# Patient Record
Sex: MALE | Race: WHITE
[De-identification: names, ages, dates, MRNs, and addresses within clinical notes are randomized per-mention and may not be internally consistent; named-entity substitution may affect disease eponyms.]

---

## 2017-03-09 ENCOUNTER — HOSPITAL ENCOUNTER (EMERGENCY)
Dept: HOSPITAL 62 - ER | Age: 67
Discharge: HOME | End: 2017-03-09
Payer: SELF-PAY

## 2017-03-09 VITALS — SYSTOLIC BLOOD PRESSURE: 128 MMHG | DIASTOLIC BLOOD PRESSURE: 98 MMHG

## 2017-03-09 DIAGNOSIS — R09.89: ICD-10-CM

## 2017-03-09 DIAGNOSIS — R06.02: ICD-10-CM

## 2017-03-09 DIAGNOSIS — R05: ICD-10-CM

## 2017-03-09 DIAGNOSIS — R68.83: ICD-10-CM

## 2017-03-09 DIAGNOSIS — Z99.81: ICD-10-CM

## 2017-03-09 DIAGNOSIS — R07.89: ICD-10-CM

## 2017-03-09 DIAGNOSIS — Z87.891: ICD-10-CM

## 2017-03-09 DIAGNOSIS — Z79.899: ICD-10-CM

## 2017-03-09 DIAGNOSIS — J44.9: Primary | ICD-10-CM

## 2017-03-09 LAB
ALBUMIN SERPL-MCNC: 4.2 G/DL (ref 3.5–5)
ALP SERPL-CCNC: 91 U/L (ref 38–126)
ALT SERPL-CCNC: 33 U/L (ref 21–72)
ANION GAP SERPL CALC-SCNC: 14 MMOL/L (ref 5–19)
AST SERPL-CCNC: 43 U/L (ref 17–59)
BASOPHILS NFR BLD MANUAL: 0 % (ref 0–2)
BILIRUB DIRECT SERPL-MCNC: 0 MG/DL (ref 0–0.3)
BILIRUB SERPL-MCNC: 0.6 MG/DL (ref 0.2–1.3)
BUN SERPL-MCNC: 10 MG/DL (ref 7–20)
CALCIUM: 9.8 MG/DL (ref 8.4–10.2)
CHLORIDE SERPL-SCNC: 88 MMOL/L (ref 98–107)
CK MB SERPL-MCNC: 0.69 NG/ML (ref ?–4.55)
CK SERPL-CCNC: 26 U/L (ref 55–170)
CO2 SERPL-SCNC: 37 MMOL/L (ref 22–30)
CREAT SERPL-MCNC: 0.55 MG/DL (ref 0.52–1.25)
EOSINOPHIL NFR BLD MANUAL: 0 % (ref 0–6)
ERYTHROCYTE [DISTWIDTH] IN BLOOD BY AUTOMATED COUNT: 13.4 % (ref 11.5–14)
GLUCOSE SERPL-MCNC: 124 MG/DL (ref 75–110)
HCT VFR BLD CALC: 50.9 % (ref 37.9–51)
HGB BLD-MCNC: 17.3 G/DL (ref 13.5–17)
HGB HCT DIFFERENCE: 1
MACROCYTES BLD QL SMEAR: (no result)
MCH RBC QN AUTO: 34.1 PG (ref 27–33.4)
MCHC RBC AUTO-ENTMCNC: 34 G/DL (ref 32–36)
MCV RBC AUTO: 100 FL (ref 80–97)
NEUTS BAND NFR BLD MANUAL: 6 % (ref 3–5)
POLYCHROMASIA BLD QL SMEAR: (no result)
POTASSIUM SERPL-SCNC: 4.2 MMOL/L (ref 3.6–5)
PROT SERPL-MCNC: 7.9 G/DL (ref 6.3–8.2)
RBC # BLD AUTO: 5.07 10^6/UL (ref 4.35–5.55)
SODIUM SERPL-SCNC: 139.4 MMOL/L (ref 137–145)
TOTAL CELLS COUNTED BLD: 100
TROPONIN I SERPL-MCNC: < 0.012 NG/ML
VARIANT LYMPHS NFR BLD MANUAL: 14 % (ref 13–45)
WBC # BLD AUTO: 10.2 10^3/UL (ref 4–10.5)

## 2017-03-09 PROCEDURE — 93005 ELECTROCARDIOGRAM TRACING: CPT

## 2017-03-09 PROCEDURE — 36415 COLL VENOUS BLD VENIPUNCTURE: CPT

## 2017-03-09 PROCEDURE — 99285 EMERGENCY DEPT VISIT HI MDM: CPT

## 2017-03-09 PROCEDURE — 71010: CPT

## 2017-03-09 PROCEDURE — 84484 ASSAY OF TROPONIN QUANT: CPT

## 2017-03-09 PROCEDURE — 94640 AIRWAY INHALATION TREATMENT: CPT

## 2017-03-09 PROCEDURE — 85025 COMPLETE CBC W/AUTO DIFF WBC: CPT

## 2017-03-09 PROCEDURE — 93010 ELECTROCARDIOGRAM REPORT: CPT

## 2017-03-09 PROCEDURE — 96374 THER/PROPH/DIAG INJ IV PUSH: CPT

## 2017-03-09 PROCEDURE — 82550 ASSAY OF CK (CPK): CPT

## 2017-03-09 PROCEDURE — 82553 CREATINE MB FRACTION: CPT

## 2017-03-09 PROCEDURE — 80053 COMPREHEN METABOLIC PANEL: CPT

## 2017-03-09 PROCEDURE — 83880 ASSAY OF NATRIURETIC PEPTIDE: CPT

## 2017-03-09 RX ADMIN — ALBUTEROL SULFATE SCH MG: 2.5 SOLUTION RESPIRATORY (INHALATION) at 15:22

## 2017-03-09 RX ADMIN — ALBUTEROL SULFATE SCH MG: 2.5 SOLUTION RESPIRATORY (INHALATION) at 14:56

## 2017-03-09 NOTE — ER DOCUMENT REPORT
ED Medical Screen (RME)





- General


Stated Complaint: CHEST PAIN


Notes: 


patient is a 66 year old male p/w chest pain and productive cough for one week 

that has gooten worse about two days ago. SOB, dyspnea. Chest pain is worse 

with cough. Albuterol nebulizers helping with cough. 





PMH: COPD on 3L, CAD


former smoker








Stress test 3-4 years ago but denies cath














I have greeted and performed a rapid initial assessment of this patient. A 

comprehensive ED assessment and evaluation of the patient, analysis of test 

results and completion of the medical decision making process will be conducted 

by additional ED providers.


TRAVEL OUTSIDE OF THE [place] IN LAST 30 DAYS: No





- Related Data


Allergies/Adverse Reactions: 


 





No Known Allergies Allergy (Verified 03/09/17 14:05)


 











Past Medical History


Pulmonary Medical History: Reports: Hx COPD


Neurological Medical History: Denies: Hx Seizures


Psychiatric Medical History: 


   Denies: Hx Depression





- Immunizations


Hx Diphtheria, Pertussis, Tetanus Vaccination: Yes





Physical Exam





- Vital signs


Vitals: 





 











Temp Pulse Resp BP Pulse Ox


 


 98.1 F   115 H  16   120/67   84 L


 


 03/09/17 14:00  03/09/17 14:00  03/09/17 14:00  03/09/17 14:00  03/09/17 14:00














Course





- Vital Signs


Vital signs: 





 











Temp Pulse Resp BP Pulse Ox


 


 98.1 F   115 H  16   120/67   84 L


 


 03/09/17 14:00  03/09/17 14:00  03/09/17 14:00  03/09/17 14:00  03/09/17 14:00

## 2017-03-09 NOTE — ER DOCUMENT REPORT
ED Respiratory Problem





- General


Mode of Arrival: Ambulatory


Information source: Patient


TRAVEL OUTSIDE OF THE [place] IN LAST 30 DAYS: No





- HPI


Patient complains to provider of: Short of breath





<CESARIO MIDDLETON - Last Filed: 03/09/17 19:43>





<ARACELIS KULKARNI - Last Filed: 03/15/17 22:29>





- General


Chief Complaint: Chest Pain


Stated Complaint: CHEST PAIN


Notes: 


Patient is a 66-year-old male that presents to the emergency department today 

with complaints of shortness of breath. Patient states he is on 3L of home O2 

at all times. Patient states his son had an upper respiratory illness recently 

and he visited him so he believes he caught a virus from his son. Patient 

states he has albuterol treatments at home. Patient states he has had a cough 

and chills at home. Patient denies any fevers or recent steroid use.  (CESARIO MIDDLETON)





- Related Data


Allergies/Adverse Reactions: 


 





No Known Allergies Allergy (Verified 03/09/17 14:05)


 











Past Medical History





- General


Information source: Patient





- Social History


Smoking Status: Former Smoker


Cigarette use (# per day): No


Chew tobacco use (# tins/day): No


Frequency of alcohol use: None


Drug Abuse: None


Lives with: Family


Family History: Reviewed & Not Pertinent


Patient has suicidal ideation: No


Patient has homicidal ideation: No


Pulmonary Medical History: Reports: Hx COPD


Surgical Hx: Negative





- Immunizations


Hx Diphtheria, Pertussis, Tetanus Vaccination: Yes





<CESARIO MIDDLETON - Last Filed: 03/09/17 19:43>





Review of Systems





- Review of Systems


Constitutional: See HPI, Chills.  denies: Fever


EENT: No symptoms reported


Cardiovascular: No symptoms reported


Respiratory: See HPI, Cough, Short of breath


Gastrointestinal: No symptoms reported


Genitourinary: No symptoms reported


Male Genitourinary: No symptoms reported


Musculoskeletal: No symptoms reported


Skin: No symptoms reported


Hematologic/Lymphatic: No symptoms reported


Neurological/Psychological: No symptoms reported


-: Yes All other systems reviewed and negative





<CESARIO MIDDLETON - Last Filed: 03/09/17 19:43>





Physical Exam





- General


General appearance: Appears well, Alert


In distress: None





- HEENT


Head: Normocephalic, Atraumatic


Eyes: Normal


Extraocular movements intact: Yes





- Respiratory


Respiratory status: No respiratory distress


Breath sounds: Wheezing - expiratory


Chest palpation: Normal





- Cardiovascular


Rhythm: Regular


Heart sounds: Normal auscultation


Murmur: No





- Abdominal


Inspection: Normal


Distension: No distension


Bowel sounds: Normal


Tenderness: Nontender





- Extremities


General upper extremity: Normal inspection, Normal ROM.  No: Edema


General lower extremity: Normal inspection, Normal ROM.  No: Edema





- Neurological


Neuro grossly intact: Yes


Cognition: Normal


Orientation: AAOx4


Speech: Normal





- Psychological


Associated symptoms: Normal affect, Normal mood





- Skin


Skin Temperature: Warm


Skin Moisture: Dry


Skin Color: Normal





<CESARIO MIDDLETON - Last Filed: 03/09/17 19:43>





Course





- Laboratory


Result Diagrams: 


 03/09/17 14:15





 03/09/17 14:15





<CESARIO MIDDLETON - Last Filed: 03/09/17 19:43>





- Laboratory


Result Diagrams: 


 03/09/17 14:15





 03/09/17 14:15





<ARACELIS KULKARNI - Last Filed: 03/15/17 22:29>





- Re-evaluation


Re-evalutation: 


03/09/17 19:01


I personally performed the services described in the documentation, reviewed 

and edited the documentation which was dictated to my scribe in my presence, 

and it accurately records my words and actions.





Patient with a history of severe COPD presents with a one-week history of cough 

and chest congestion. He was a long-time smoker quit recently started and then 

quit again. He says his chest hurts only with coughing. He is on 3 L of oxygen 

at home not taking anything other than albuterol. No fevers chills chest pain 

pressure or exertional chest pain or showers breath denies a cardiac history no 

nausea vomiting dull pain history DVT or pulmonary emboli. On examination he is 

well-appearing nontoxic 89-92% on his 3 L that he takes at home. Normotensive 

lungs with expiratory wheeze improved with albuterol and Solu-Medrol. Chest x-

ray negative for pneumonia negative acute labs. We will discharge on steroids 

told to take over-the-counter allergy medication for primary care physician to 

3 days and discussed reasons for ED return sooner





03/09/17 19:03








03/09/17 19:07


 (ARACELIS KULKARNI)





- Vital Signs


Vital signs: 


 











Temp Pulse Resp BP Pulse Ox


 


 98 F   115 H  16   128/98 H  91 L


 


 03/09/17 19:00  03/09/17 14:00  03/09/17 19:03  03/09/17 19:03  03/09/17 19:03














- Laboratory


Laboratory results interpreted by me: 


 











  03/09/17 03/09/17





  14:15 14:15


 


Hgb  17.3 H 


 


MCV  100 H 


 


MCH  34.1 H 


 


Band Neutrophils %  6 H 


 


Monocytes % (Manual)  17 H 


 


Abs Monocytes (Manual)  1.7 H 


 


Chloride   88 L


 


Carbon Dioxide   37 H


 


Glucose   124 H


 


Creatine Kinase   26 L














Discharge





<CESARIO MIDDLETON - Last Filed: 03/09/17 19:43>





<ARACELIS KULKARNI - Last Filed: 03/15/17 22:29>





- Discharge


Clinical Impression: 


COPD (chronic obstructive pulmonary disease)


Qualifiers:


 COPD type: emphysema Emphysema type: unspecified Qualified Code(s): J43.9 - 

Emphysema, unspecified





Condition: Stable


Disposition: HOME, SELF-CARE


Additional Instructions: 


Bronchitis/copd





     You have acute bronchitis.  This disease is an infection or inflammation 

of the air passageways in your lungs.  Symptoms usually include cough, low 

grade fever, shortness of breath, and wheezing.  The cough usually persists for 

a couple of weeks.


     Most cases of bronchitis get better without antibiotics.  We prescribe 

antibiotics when we believe bacteria are damaging your airways, or if there's 

high risk the bronchitis will worsen into pneumonia.


     Increase your fluid intake. A cool mist humidifier may make your lungs 

more comfortable.  An expectorant (cough medicine that loosens phlegm) can 

help.  If you smoke, STOP!!!  Recovery from bronchitis can be somewhat slow, 

but you should see improvement within a day or two.


     Repeated episodes of bronchitis may result in lung damage -- for example, 

chronic bronchitis, recurrent pneumonias, or emphysema.


     Call the doctor if you develop increasing fever, shortness of breath, 

chest pain, bloody sputum, or otherwise worsen.  If you have not improved at 

all after several days, contact the physician.








Prescriptions: 


Prednisone [Deltasone 20 mg Tablet] 3 tab PO DAILY 5 Days


Referrals: 


[facility] [Provider Group] - Follow up as needed (Follow-up with 

your primary care physician in 2-3 days return for increasing worsening or new 

symptoms)





Scribe Documentation





- Scribe


Written by Konrad:: Konrad Dior, 3/9/2017 1941


acting as scribe for :: Iván





<CESARIO MIDDLETON - Last Filed: 03/09/17 19:43>

## 2017-03-09 NOTE — EKG REPORT
SEVERITY:- ABNORMAL ECG -

SINUS TACHYCARDIA

BIATRIAL ABNORMALITIES

LEFT ANTERIOR FASCICULAR BLOCK

:

Confirmed by: Nate Encarnacion 09-Mar-2017 20:02:00

## 2017-05-18 ENCOUNTER — HOSPITAL ENCOUNTER (INPATIENT)
Dept: HOSPITAL 62 - ER | Age: 67
LOS: 7 days | Discharge: HOME | DRG: 190 | End: 2017-05-25
Attending: INTERNAL MEDICINE | Admitting: INTERNAL MEDICINE
Payer: MEDICARE

## 2017-05-18 DIAGNOSIS — J44.1: Primary | ICD-10-CM

## 2017-05-18 DIAGNOSIS — Z66: ICD-10-CM

## 2017-05-18 DIAGNOSIS — F10.10: ICD-10-CM

## 2017-05-18 DIAGNOSIS — F17.210: ICD-10-CM

## 2017-05-18 DIAGNOSIS — I50.32: ICD-10-CM

## 2017-05-18 DIAGNOSIS — D69.6: ICD-10-CM

## 2017-05-18 DIAGNOSIS — I48.0: ICD-10-CM

## 2017-05-18 DIAGNOSIS — J96.21: ICD-10-CM

## 2017-05-18 DIAGNOSIS — Z99.81: ICD-10-CM

## 2017-05-18 DIAGNOSIS — J96.22: ICD-10-CM
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
## 2017-05-18 DIAGNOSIS — I27.2: ICD-10-CM

## 2017-05-18 LAB
ALBUMIN SERPL-MCNC: 4.1 G/DL (ref 3.5–5)
ALP SERPL-CCNC: 73 U/L (ref 38–126)
ALT SERPL-CCNC: 56 U/L (ref 21–72)
ANION GAP SERPL CALC-SCNC: 15 MMOL/L (ref 5–19)
AST SERPL-CCNC: 97 U/L (ref 17–59)
BASE EXCESS BLDV CALC-SCNC: 4.5 MMOL/L
BASOPHILS # BLD AUTO: 0 10^3/UL (ref 0–0.2)
BASOPHILS NFR BLD AUTO: 0.3 % (ref 0–2)
BILIRUB DIRECT SERPL-MCNC: 0.5 MG/DL (ref 0–0.4)
BILIRUB SERPL-MCNC: 0.7 MG/DL (ref 0.2–1.3)
BUN SERPL-MCNC: 5 MG/DL (ref 7–20)
CALCIUM: 8.9 MG/DL (ref 8.4–10.2)
CHLORIDE SERPL-SCNC: 92 MMOL/L (ref 98–107)
CK SERPL-CCNC: 228 U/L (ref 55–170)
CO2 SERPL-SCNC: 31 MMOL/L (ref 22–30)
CREAT SERPL-MCNC: 0.58 MG/DL (ref 0.52–1.25)
EOSINOPHIL # BLD AUTO: 0.1 10^3/UL (ref 0–0.6)
EOSINOPHIL NFR BLD AUTO: 2.1 % (ref 0–6)
ERYTHROCYTE [DISTWIDTH] IN BLOOD BY AUTOMATED COUNT: 14.9 % (ref 11.5–14)
GLUCOSE SERPL-MCNC: 105 MG/DL (ref 75–110)
HCO3 BLDV-SCNC: 35.6 MMOL/L (ref 20–32)
HCT VFR BLD CALC: 57.2 % (ref 37.9–51)
HGB BLD-MCNC: 19 G/DL (ref 13.5–17)
HGB HCT DIFFERENCE: -0.2
LIPASE SERPL-CCNC: 18.3 U/L (ref 23–300)
LYMPHOCYTES # BLD AUTO: 0.7 10^3/UL (ref 0.5–4.7)
LYMPHOCYTES NFR BLD AUTO: 13.1 % (ref 13–45)
MCH RBC QN AUTO: 33.7 PG (ref 27–33.4)
MCHC RBC AUTO-ENTMCNC: 33.3 G/DL (ref 32–36)
MCV RBC AUTO: 101 FL (ref 80–97)
MONOCYTES # BLD AUTO: 1 10^3/UL (ref 0.1–1.4)
MONOCYTES NFR BLD AUTO: 19 % (ref 3–13)
NEUTROPHILS # BLD AUTO: 3.4 10^3/UL (ref 1.7–8.2)
NEUTS SEG NFR BLD AUTO: 65.5 % (ref 42–78)
PCO2 BLDV: 78.5 MMHG (ref 35–63)
PH BLDV: 7.28 [PH] (ref 7.3–7.42)
POTASSIUM SERPL-SCNC: 4.2 MMOL/L (ref 3.6–5)
PROT SERPL-MCNC: 7.2 G/DL (ref 6.3–8.2)
RBC # BLD AUTO: 5.65 10^6/UL (ref 4.35–5.55)
SODIUM SERPL-SCNC: 137.9 MMOL/L (ref 137–145)
TROPONIN I SERPL-MCNC: < 0.012 NG/ML
WBC # BLD AUTO: 5.2 10^3/UL (ref 4–10.5)

## 2017-05-18 PROCEDURE — 93005 ELECTROCARDIOGRAM TRACING: CPT

## 2017-05-18 PROCEDURE — 36415 COLL VENOUS BLD VENIPUNCTURE: CPT

## 2017-05-18 PROCEDURE — 83690 ASSAY OF LIPASE: CPT

## 2017-05-18 PROCEDURE — 85025 COMPLETE CBC W/AUTO DIFF WBC: CPT

## 2017-05-18 PROCEDURE — 71275 CT ANGIOGRAPHY CHEST: CPT

## 2017-05-18 PROCEDURE — 85027 COMPLETE CBC AUTOMATED: CPT

## 2017-05-18 PROCEDURE — 71010: CPT

## 2017-05-18 PROCEDURE — 82550 ASSAY OF CK (CPK): CPT

## 2017-05-18 PROCEDURE — 83605 ASSAY OF LACTIC ACID: CPT

## 2017-05-18 PROCEDURE — 93010 ELECTROCARDIOGRAM REPORT: CPT

## 2017-05-18 PROCEDURE — 96368 THER/DIAG CONCURRENT INF: CPT

## 2017-05-18 PROCEDURE — 87070 CULTURE OTHR SPECIMN AEROBIC: CPT

## 2017-05-18 PROCEDURE — 80076 HEPATIC FUNCTION PANEL: CPT

## 2017-05-18 PROCEDURE — 84484 ASSAY OF TROPONIN QUANT: CPT

## 2017-05-18 PROCEDURE — 87205 SMEAR GRAM STAIN: CPT

## 2017-05-18 PROCEDURE — 80048 BASIC METABOLIC PNL TOTAL CA: CPT

## 2017-05-18 PROCEDURE — 87040 BLOOD CULTURE FOR BACTERIA: CPT

## 2017-05-18 PROCEDURE — 94660 CPAP INITIATION&MGMT: CPT

## 2017-05-18 PROCEDURE — 94640 AIRWAY INHALATION TREATMENT: CPT

## 2017-05-18 PROCEDURE — 99291 CRITICAL CARE FIRST HOUR: CPT

## 2017-05-18 PROCEDURE — 82803 BLOOD GASES ANY COMBINATION: CPT

## 2017-05-18 PROCEDURE — 96365 THER/PROPH/DIAG IV INF INIT: CPT

## 2017-05-18 PROCEDURE — 83880 ASSAY OF NATRIURETIC PEPTIDE: CPT

## 2017-05-18 RX ADMIN — IPRATROPIUM BROMIDE SCH MG: 0.5 SOLUTION RESPIRATORY (INHALATION) at 20:50

## 2017-05-18 RX ADMIN — LEVALBUTEROL HYDROCHLORIDE SCH MG: 1.25 SOLUTION RESPIRATORY (INHALATION) at 20:49

## 2017-05-18 RX ADMIN — DOCUSATE SODIUM SCH MG: 100 CAPSULE, LIQUID FILLED ORAL at 17:55

## 2017-05-18 RX ADMIN — SODIUM CHLORIDE PRN ML: 9 INJECTION, SOLUTION INTRAVENOUS at 17:56

## 2017-05-18 RX ADMIN — SODIUM CHLORIDE PRN ML: 9 INJECTION, SOLUTION INTRAVENOUS at 12:08

## 2017-05-18 RX ADMIN — METHYLPREDNISOLONE SODIUM SUCCINATE SCH MG: 125 INJECTION, POWDER, FOR SOLUTION INTRAMUSCULAR; INTRAVENOUS at 17:55

## 2017-05-18 RX ADMIN — MAGNESIUM SULFATE IN DEXTROSE SCH ML: 10 INJECTION, SOLUTION INTRAVENOUS at 13:02

## 2017-05-18 RX ADMIN — MAGNESIUM SULFATE IN DEXTROSE SCH ML: 10 INJECTION, SOLUTION INTRAVENOUS at 14:49

## 2017-05-18 RX ADMIN — SODIUM CHLORIDE PRN ML: 9 INJECTION, SOLUTION INTRAVENOUS at 12:07

## 2017-05-18 NOTE — ER DOCUMENT REPORT
ED Respiratory Problem





- General


Chief Complaint: Shortness Of Breath


Stated Complaint: DIFFICULTY BREATHING


Time Seen by Provider: 05/18/17 10:30


Notes: 


The patient is a 66-year-old male, past medical history COPD (on home 2-3 L O2)

, smoker, presents with increasing shortness of breath and wheezing over the 

past 3 days.  He has been using his albuterol inhaler without much relief at 

home.  He received 125 mg of Solu-Medrol and 1 albuterol prior to arrival by 

EMS.  He said a family member was having cold symptoms last week and he started 

to develop a productive green cough this week.  Denies chest pain, leg swelling

, hemoptysis, nausea, vomiting, fevers, back pain or recent travel.


TRAVEL OUTSIDE OF THE [place] IN LAST 30 DAYS: No





- Related Data


Allergies/Adverse Reactions: 


 





No Known Allergies Allergy (Verified 03/09/17 14:05)


 











Past Medical History





- General


Information source: Patient





- Social History


Smoking Status: Current Every Day Smoker


Family History: Reviewed & Not Pertinent


Pulmonary Medical History: Reports: Hx COPD


Neurological Medical History: Denies: Hx Seizures


Renal/ Medical History: Denies: Hx Peritoneal Dialysis


Psychiatric Medical History: 


   Denies: Hx Depression


Past Surgical History: Reports: Hx Orthopedic Surgery





- Immunizations


Hx Diphtheria, Pertussis, Tetanus Vaccination: Yes





Review of Systems





- Review of Systems


Notes: 


REVIEW OF SYSTEMS:


CONSTITUTIONAL: -fevers, -chills


EENT: -eye pain, -difficulty swallowing, -nasal congestion


CARDIOVASCULAR:-chest pain, -syncope.


RESPIRATORY: +cough, +SOB


GASTROINTESTINAL: -abdominal pain, -nausea, -vomiting, -diarrhea


GENITOURINARY: -dysuria, -hematuria


MUSCULOSKELETAL: -back pain, -neck pain


SKIN: -rash or skin lesions.


HEMATOLOGIC: -easy bruising or bleeding.


LYMPHATIC: -swollen, enlarged glands.


NEUROLOGICAL: -altered mental status or loss of consciousness, -headache, -

neurologic symptoms


PSYCHIATRIC: -anxiety, -depression.


ALL OTHER SYSTEMS REVIEWED AND NEGATIVE.





Physical Exam





- Vital signs


Vitals: 


 











Temp Resp


 


 98.4 F   22 H


 


 05/18/17 10:15  05/18/17 10:15














- Notes


Notes: 


PHYSICAL EXAMINATION:





GENERAL: Mild distress.





HEAD: Atraumatic, normocephalic.





EYES: Pupils equal round and reactive to light, extraocular movements intact, 

sclera anicteric, conjunctiva are normal.





ENT: nares patent, oropharynx clear without exudates.  Moist mucous membranes.





NECK: Normal range of motion, supple without lymphadenopathy





LUNGS: Diffuse wheezing and coarse breath sounds.





HEART: Tachycardic, regular rhythm





ABDOMEN: Soft, nontender, normoactive bowel sounds.  No guarding, no rebound.  

No masses appreciated.





EXTREMITIES: Normal range of motion, no pitting or edema.  No cyanosis.





NEUROLOGICAL: Cranial nerves grossly intact.  Normal speech, normal gait.  

Normal sensory, motor, and reflex exams.





PSYCH: Normal mood, normal affect.





SKIN: Warm, Dry, normal turgor, no rashes or lesions noted.





Course





- Re-evaluation


Re-evalutation: 


After DuoNebs and steroids, patient remains very wheezy, tachypneic and 

tachycardic.  We will add magnesium, continuous albuterol and cover with 

Levaquin for new green sputum today. Will also begin BiPap for hypercarbic 

acidosis and tachypnea.





After magnesium, continuous albuterol, Levaquin and BiPap, pt is feeling much 

better. CTA is negative for PE and CXR does not show an acute process.  His 

tachypnea and wheezing improved.





05/18/17 14:17 Spoke to Dr. Matamoros and will admit patient as Inpatient to [facility] 

on Bipap.





- Vital Signs


Vital signs: 


 











Temp Pulse Resp BP Pulse Ox


 


 98.4 F      17   136/96 H  95 


 


 05/18/17 10:15     05/18/17 14:00  05/18/17 11:01  05/18/17 14:00














- Laboratory


Result Diagrams: 


 05/18/17 10:20





 05/18/17 10:20


Laboratory results interpreted by me: 


 











  05/18/17 05/18/17 05/18/17





  10:20 10:20 10:20


 


RBC  5.65 H  


 


Hgb  19.0 H  


 


Hct  57.2 H  


 


MCV  101 H  


 


MCH  33.7 H  


 


RDW  14.9 H  


 


Plt Count  107 L  


 


Monocytes %  19.0 H  


 


VBG pH    7.28 L


 


VBG pCO2    78.5 H*


 


VBG HCO3    35.6 H


 


Chloride   92 L 


 


Carbon Dioxide   31 H 


 


BUN   5 L 


 


Direct Bilirubin   0.5 H 


 


AST   97 H 


 


Creatine Kinase   228 H 


 


Lipase   18.3 L 














- Diagnostic Test


Radiology reviewed: Image reviewed, Reports reviewed


Radiology results interpreted by me: 


CXR: COPD. NAD


CTA: No PE. NAD.





- EKG Interpretation by Me


EKG shows normal: Sinus rhythm, Axis, Intervals, QRS Complexes, ST-T Waves


Rate: Tachycardia


When compared to previous EKG there are: Previous EKG unavailable


Additional EKG results interpreted by me: 


Abnormal R wave progression





Critical Care Note





- Critical Care Note


Total time excluding time spent on procedures (mins): 45





Discharge





- Discharge


Clinical Impression: 


 COPD with exacerbation





Condition: Stable


Disposition: ADMITTED AS INPATIENT


Admitting Provider: Hospitalist Malathi Matamoros


Unit Admitted: [facility]

## 2017-05-18 NOTE — PDOC H&P
History of Present Illness


Admission Date/PCP: 


  05/18/17 14:48





  





Patient complains of: SOB


History of Present Illness: 


BING BURGESS SR is a 66 year old male, w/ COPD, exposed to someone sick w/ 

virus 2 weeks ago w/ cough and sinus congestion developed colds and cough 3 

days later. Since then, he started to have mild SOB and wheezing that improves w

/ his inhalers. He developed low grade fever as well that eventually resolved. 

The SOB persisted. He bacame weak and for the past several days, cough became 

paroxysmal and he started developing pleurisy. Cough is now productive of 

greenish phlegm. He developed dyspnea on exertion even w/ mild activity. He 

presented to ED . He was hypoxic and hypercarbic and acidotic. He was given 

nebulizers and placed on BIPAP. He was then refered for admission. No 

sorethroat or headache nor sinus congestion.








Past Medical History


Past Medical History: 


Medication reconcillation PND verification from patients pharmacist.


Cardiac Medical History: Reports: Congestive Heart Failure - diastolic dysfx.


Pulmonary Medical History: Reports: Chronic Obstructive Pulmonary Disease (COPD)

, Other - Pulmonary hypertension


Neurological Medical History: 


   Denies: Seizures


Psychiatric Medical History: Reports: Substance Abuse - alcohol, Tobacco 

Dependency


   Denies: Depression





Past Surgical History


Past Surgical History: Reports: Orthopedic Surgery





Social History


Smoking Status: Current Every Day Smoker - quit last week.


Frequency of Alcohol Use: Occasional


Hx Recreational Drug Use: Yes


Drugs: Marijuana


Hx Prescription Drug Abuse: No





Family History


Family History: Hypertension


Parental Family History Reviewed: Yes


Children Family History Reviewed: Yes


Sibling(s) Family History Reviewed.: Yes





Medication/Allergy


Allergies/Adverse Reactions: 


 





No Known Allergies Allergy (Verified 03/09/17 14:05)


 











Review of Systems


Constitutional: PRESENT: fever(s).  ABSENT: chills, headache(s), night sweats, 

weight gain, weight loss


Eyes: ABSENT: visual disturbances


Ears: ABSENT: hearing changes


Nose, Mouth, and Throat: ABSENT: mouth pain, sore throat


Cardiovascular: PRESENT: chest pain, dyspnea on exertion.  ABSENT: edema, 

orthropnea, palpitations


Respiratory: PRESENT: cough, dyspnea, sputum.  ABSENT: hemoptysis


Gastrointestinal: ABSENT: abdominal pain, constipation, diarrhea, hematemesis, 

hematochezia, melena, nausea, vomiting


Genitourinary: ABSENT: difficulty urinating, dysuria, hematuria


Musculoskeletal: ABSENT: joint swelling


Integumentary: ABSENT: pruritus, rash, wounds


Neurological: ABSENT: abnormal gait, abnormal speech, confusion, dizziness, 

focal weakness, syncope


Psychiatric: ABSENT: anxiety, depression, homidical ideation, suicidal ideation


Endocrine: ABSENT: cold intolerance, heat intolerance, polydipsia, polyuria


Hematologic/Lymphatic: ABSENT: easy bleeding, easy bruising





Physical Exam


Vital Signs: 


 











Temp Pulse Resp BP Pulse Ox


 


 98 F      18   129/112 H  91 L


 


 05/18/17 16:11     05/18/17 16:11  05/18/17 16:11  05/18/17 16:11











General appearance: PRESENT: no acute distress, well-developed, other - on BIPAP


Head exam: PRESENT: atraumatic, normocephalic


Eye exam: PRESENT: conjunctiva pink, EOMI, PERRLA.  ABSENT: scleral icterus


Ear exam: PRESENT: normal external ear exam.  ABSENT: drainage


Mouth exam: PRESENT: dry mucosa, neck supple, tongue midline


Throat exam: ABSENT: post pharyngeal erythema, tonsillar erythema, tonsillar 

exudate


Neck exam: ABSENT: carotid bruit, JVD, lymphadenopathy, thyromegaly


Respiratory exam: PRESENT: decreased breath sounds - B/L, rhonchi - few B/L, 

wheezes - mild, expiratory.  ABSENT: rales


Cardiovascular exam: PRESENT: RRR.  ABSENT: diastolic murmur, rubs, systolic 

murmur


Pulses: PRESENT: normal dorsalis pedis pul


Vascular exam: PRESENT: normal capillary refill


GI/Abdominal exam: PRESENT: normal bowel sounds, soft.  ABSENT: distended, 

guarding, mass, organolmegaly, rebound, tenderness


Rectal exam: PRESENT: deferred


Extremities exam: PRESENT: full ROM.  ABSENT: calf tenderness, clubbing, pedal 

edema


Neurological exam: PRESENT: alert, awake, oriented to person, oriented to place

, oriented to time, oriented to situation


Psychiatric exam: PRESENT: appropriate affect, normal mood.  ABSENT: homicidal 

ideation, suicidal ideation


Skin exam: PRESENT: dry, intact, warm.  ABSENT: cyanosis, rash





Results


Impressions: 


 





Chest X-Ray  05/18/17 10:31


IMPRESSION:  COPD.  NO ACUTE RADIOGRAPHIC FINDING IN THE CHEST.


 








Chest/Abdomen CTA  05/18/17 11:17


IMPRESSION:  NORMAL CTA OF THE CHEST. NO PULMONARY EMBOLI.


 














Assessment & Plan





- Diagnosis


(1) Acute respiratory failure


Qualifiers: 


     Respiratory failure complication: hypoxia and hypercapnia        Qualified 

Code(s): J96.01 - Acute respiratory failure with hypoxia; J96.02 - Acute 

respiratory failure with hypercapnia  


Is this a current diagnosis for this admission?: Yes





(2) COPD with exacerbation


Is this a current diagnosis for this admission?: Yes





(3) Elevated hematocrit


Is this a current diagnosis for this admission?: Yes





(4) CHF (congestive heart failure)


Qualifiers: 


     Congestive heart failure type: diastolic     Congestive heart failure 

chronicity: chronic        Qualified Code(s): I50.32 - Chronic diastolic (

congestive) heart failure  


Is this a current diagnosis for this admission?: Yes





(5) Alcohol abuse


Is this a current diagnosis for this admission?: Yes





(6) Pulmonary hypertension


Is this a current diagnosis for this admission?: Yes





(7) Thrombocytopenia


Is this a current diagnosis for this admission?: Yes








- Time


Time Spent: 50 to 70 Minutes





- Inpatient Certification


Based on my medical assessment, after consideration of the patient's 

comorbidities, presenting symptoms, or acuity I expect that the services needed 

warrant INPATIENT care.: Yes


I certify that my determination is in accordance with my understanding of 

Medicare's requirements for reasonable and necessary INPATIENT services [42 CFR 

412.3e].: Yes


Medical Necessity: Significant Comorbidiites Make Outpatient Treatment Too Risky

, Need Close Monitoring Due to Risk of Patient Decompensation, Need For 

Continuous Telemetry Monitoring, Risk of Complication if Not Cared For in 

Hospital, Risk of Diagnosis Which Will Require Inpatient Eval/Care/Monitoring


Post Hospital Care: D/C Planner Documentation





- Plan Summary


Plan Summary: 


Admit to IMCU. Continue BIPAP. Begin IV steroids and RTC nebulizers. Culture 

the sputum and begin oral antibx. Gently hydrate and monitor Hct/plt. Arixtra 

for DVT prophylaxis. Further testing depends on the initial evaluation as 

outlined above.

## 2017-05-19 LAB
ANION GAP SERPL CALC-SCNC: 5 MMOL/L (ref 5–19)
BUN SERPL-MCNC: 5 MG/DL (ref 7–20)
CALCIUM: 8.5 MG/DL (ref 8.4–10.2)
CHLORIDE SERPL-SCNC: 97 MMOL/L (ref 98–107)
CO2 SERPL-SCNC: 35 MMOL/L (ref 22–30)
CREAT SERPL-MCNC: 0.5 MG/DL (ref 0.52–1.25)
ERYTHROCYTE [DISTWIDTH] IN BLOOD BY AUTOMATED COUNT: 14.9 % (ref 11.5–14)
GLUCOSE SERPL-MCNC: 144 MG/DL (ref 75–110)
HCT VFR BLD CALC: 51.9 % (ref 37.9–51)
HGB BLD-MCNC: 17 G/DL (ref 13.5–17)
HGB HCT DIFFERENCE: -0.9
MCH RBC QN AUTO: 33.5 PG (ref 27–33.4)
MCHC RBC AUTO-ENTMCNC: 32.7 G/DL (ref 32–36)
MCV RBC AUTO: 102 FL (ref 80–97)
POTASSIUM SERPL-SCNC: 4.8 MMOL/L (ref 3.6–5)
RBC # BLD AUTO: 5.07 10^6/UL (ref 4.35–5.55)
SODIUM SERPL-SCNC: 137.3 MMOL/L (ref 137–145)
WBC # BLD AUTO: 5.7 10^3/UL (ref 4–10.5)

## 2017-05-19 RX ADMIN — LEVALBUTEROL HYDROCHLORIDE SCH MG: 1.25 SOLUTION RESPIRATORY (INHALATION) at 11:55

## 2017-05-19 RX ADMIN — LEVALBUTEROL HYDROCHLORIDE SCH MG: 1.25 SOLUTION RESPIRATORY (INHALATION) at 00:21

## 2017-05-19 RX ADMIN — IPRATROPIUM BROMIDE SCH MG: 0.5 SOLUTION RESPIRATORY (INHALATION) at 16:02

## 2017-05-19 RX ADMIN — LEVALBUTEROL HYDROCHLORIDE SCH MG: 1.25 SOLUTION RESPIRATORY (INHALATION) at 19:36

## 2017-05-19 RX ADMIN — METHYLPREDNISOLONE SODIUM SUCCINATE SCH MG: 125 INJECTION, POWDER, FOR SOLUTION INTRAMUSCULAR; INTRAVENOUS at 18:18

## 2017-05-19 RX ADMIN — LEVALBUTEROL HYDROCHLORIDE SCH MG: 1.25 SOLUTION RESPIRATORY (INHALATION) at 04:34

## 2017-05-19 RX ADMIN — DOCUSATE SODIUM SCH MG: 100 CAPSULE, LIQUID FILLED ORAL at 09:08

## 2017-05-19 RX ADMIN — LEVALBUTEROL HYDROCHLORIDE SCH MG: 1.25 SOLUTION RESPIRATORY (INHALATION) at 16:02

## 2017-05-19 RX ADMIN — IPRATROPIUM BROMIDE SCH MG: 0.5 SOLUTION RESPIRATORY (INHALATION) at 04:34

## 2017-05-19 RX ADMIN — IPRATROPIUM BROMIDE SCH MG: 0.5 SOLUTION RESPIRATORY (INHALATION) at 00:21

## 2017-05-19 RX ADMIN — IPRATROPIUM BROMIDE SCH MG: 0.5 SOLUTION RESPIRATORY (INHALATION) at 23:48

## 2017-05-19 RX ADMIN — DOCUSATE SODIUM SCH MG: 100 CAPSULE, LIQUID FILLED ORAL at 18:18

## 2017-05-19 RX ADMIN — METHYLPREDNISOLONE SODIUM SUCCINATE SCH MG: 125 INJECTION, POWDER, FOR SOLUTION INTRAMUSCULAR; INTRAVENOUS at 11:54

## 2017-05-19 RX ADMIN — LEVOFLOXACIN SCH MG: 750 TABLET, FILM COATED ORAL at 09:08

## 2017-05-19 RX ADMIN — IPRATROPIUM BROMIDE SCH MG: 0.5 SOLUTION RESPIRATORY (INHALATION) at 19:36

## 2017-05-19 RX ADMIN — IPRATROPIUM BROMIDE SCH MG: 0.5 SOLUTION RESPIRATORY (INHALATION) at 12:07

## 2017-05-19 RX ADMIN — IPRATROPIUM BROMIDE SCH MG: 0.5 SOLUTION RESPIRATORY (INHALATION) at 07:48

## 2017-05-19 RX ADMIN — FONDAPARINUX SODIUM SCH MG: 2.5 INJECTION, SOLUTION SUBCUTANEOUS at 09:08

## 2017-05-19 RX ADMIN — LANSOPRAZOLE SCH MG: 30 TABLET, ORALLY DISINTEGRATING, DELAYED RELEASE ORAL at 06:35

## 2017-05-19 RX ADMIN — METHYLPREDNISOLONE SODIUM SUCCINATE SCH MG: 125 INJECTION, POWDER, FOR SOLUTION INTRAMUSCULAR; INTRAVENOUS at 06:35

## 2017-05-19 RX ADMIN — LEVALBUTEROL HYDROCHLORIDE SCH MG: 1.25 SOLUTION RESPIRATORY (INHALATION) at 07:48

## 2017-05-19 RX ADMIN — LEVALBUTEROL HYDROCHLORIDE SCH MG: 1.25 SOLUTION RESPIRATORY (INHALATION) at 23:48

## 2017-05-19 RX ADMIN — METHYLPREDNISOLONE SODIUM SUCCINATE SCH MG: 125 INJECTION, POWDER, FOR SOLUTION INTRAMUSCULAR; INTRAVENOUS at 01:17

## 2017-05-19 NOTE — EKG REPORT
SEVERITY:- ABNORMAL ECG -

SINUS TACHYCARDIA

MULTIPLE VENTRICULAR PREMATURE COMPLEXES

PROBABLE LEFT ATRIAL ABNORMALITY

INFERIOR INFARCT, AGE INDETERMINATE

ABNRM R PROG, CONSIDER ASMI OR LEAD PLACEMENT

:

Confirmed by: Yuliana Silva MD 19-May-2017 07:11:08

## 2017-05-19 NOTE — PDOC PROGRESS REPORT
Subjective


Progress Note for:: 05/19/17


Subjective:: 


Breathing better. Wheezing is less. No temp spikes, CP, N/V. No diarrhea. Tried 

to wean BIPAP but desaturates. Abdominal muscles sore from coughing.





Physical Exam


Vital Signs: 


 











Temp Pulse Resp BP Pulse Ox


 


 98.2 F   95   17   111/95 H  94 


 


 05/19/17 04:00  05/19/17 07:48  05/19/17 08:01  05/19/17 08:01  05/19/17 08:01








 Intake & Output











 05/18/17 05/19/17 05/20/17





 06:59 06:59 06:59


 


Output Total  1900 


 


Balance  -1900 











General appearance: PRESENT: no acute distress, cooperative, other - on BIPAP


Head exam: PRESENT: normocephalic


Eye exam: PRESENT: conjunctiva pink, EOMI


Mouth exam: PRESENT: moist, neck supple


Neck exam: ABSENT: JVD


Respiratory exam: PRESENT: rhonchi - occasional, wheezes - mild B/L


Cardiovascular exam: PRESENT: RRR.  ABSENT: gallop


GI/Abdominal exam: PRESENT: soft.  ABSENT: distended, tenderness


Extremities exam: ABSENT: pedal edema


Neurological exam: PRESENT: alert, awake, oriented to situation


Skin exam: PRESENT: dry, warm.  ABSENT: cyanosis





Results


Laboratory Results: 


 





 05/19/17 06:48 





 05/19/17 06:48 





 











  05/19/17 05/19/17





  06:48 06:48


 


WBC  5.7 


 


RBC  5.07 


 


Hgb  17.0 


 


Hct  51.9 H 


 


MCV  102 H 


 


MCH  33.5 H 


 


MCHC  32.7 


 


RDW  14.9 H 


 


Plt Count  108 L 


 


Sodium   137.3


 


Potassium   4.8


 


Chloride   97 L


 


Carbon Dioxide   35 H


 


Anion Gap   5


 


BUN   5 L


 


Creatinine   0.50 L


 


Est GFR ( Amer)   > 60


 


Est GFR (Non-Af Amer)   > 60


 


Glucose   144 H


 


Calcium   8.5











Impressions: 


 





Chest X-Ray  05/18/17 10:31


IMPRESSION:  COPD.  NO ACUTE RADIOGRAPHIC FINDING IN THE CHEST.


 








Chest/Abdomen CTA  05/18/17 11:17


IMPRESSION:  NORMAL CTA OF THE CHEST. NO PULMONARY EMBOLI.


 














Assessment & Plan





- Diagnosis


(1) Acute respiratory failure


Qualifiers: 


     Respiratory failure complication: hypoxia and hypercapnia        Qualified 

Code(s): J96.01 - Acute respiratory failure with hypoxia  


Is this a current diagnosis for this admission?: Yes





(2) COPD with exacerbation


Is this a current diagnosis for this admission?: Yes





(3) Elevated hematocrit


Is this a current diagnosis for this admission?: Yes





(4) CHF (congestive heart failure)


Qualifiers: 


     Congestive heart failure type: diastolic     Congestive heart failure 

chronicity: chronic        Qualified Code(s): I50.32 - Chronic diastolic (

congestive) heart failure  


Is this a current diagnosis for this admission?: Yes





(5) Alcohol abuse


Is this a current diagnosis for this admission?: Yes





(6) Pulmonary hypertension


Is this a current diagnosis for this admission?: Yes





(7) Thrombocytopenia


Is this a current diagnosis for this admission?: Yes








- Time


Time Spent with patient: 25-34 minutes





- Plan Summary


Plan Summary: 


Cont. BIPAP.  Check CXR post hydration.  May have underlying infiltrate. Cont. 

IV steroids and nebulizers.  Cont. supportive care.

## 2017-05-20 RX ADMIN — LEVALBUTEROL HYDROCHLORIDE SCH MG: 1.25 SOLUTION RESPIRATORY (INHALATION) at 08:05

## 2017-05-20 RX ADMIN — METHYLPREDNISOLONE SODIUM SUCCINATE SCH MG: 125 INJECTION, POWDER, FOR SOLUTION INTRAMUSCULAR; INTRAVENOUS at 23:52

## 2017-05-20 RX ADMIN — FONDAPARINUX SODIUM SCH MG: 2.5 INJECTION, SOLUTION SUBCUTANEOUS at 09:45

## 2017-05-20 RX ADMIN — DOCUSATE SODIUM SCH MG: 100 CAPSULE, LIQUID FILLED ORAL at 17:29

## 2017-05-20 RX ADMIN — METHYLPREDNISOLONE SODIUM SUCCINATE SCH MG: 125 INJECTION, POWDER, FOR SOLUTION INTRAMUSCULAR; INTRAVENOUS at 05:04

## 2017-05-20 RX ADMIN — IPRATROPIUM BROMIDE SCH MG: 0.5 SOLUTION RESPIRATORY (INHALATION) at 11:35

## 2017-05-20 RX ADMIN — IPRATROPIUM BROMIDE SCH MG: 0.5 SOLUTION RESPIRATORY (INHALATION) at 19:32

## 2017-05-20 RX ADMIN — IPRATROPIUM BROMIDE SCH MG: 0.5 SOLUTION RESPIRATORY (INHALATION) at 08:05

## 2017-05-20 RX ADMIN — METHYLPREDNISOLONE SODIUM SUCCINATE SCH MG: 125 INJECTION, POWDER, FOR SOLUTION INTRAMUSCULAR; INTRAVENOUS at 11:45

## 2017-05-20 RX ADMIN — IPRATROPIUM BROMIDE SCH MG: 0.5 SOLUTION RESPIRATORY (INHALATION) at 16:15

## 2017-05-20 RX ADMIN — METHYLPREDNISOLONE SODIUM SUCCINATE SCH MG: 125 INJECTION, POWDER, FOR SOLUTION INTRAMUSCULAR; INTRAVENOUS at 00:08

## 2017-05-20 RX ADMIN — LEVALBUTEROL HYDROCHLORIDE SCH MG: 1.25 SOLUTION RESPIRATORY (INHALATION) at 16:15

## 2017-05-20 RX ADMIN — SODIUM CHLORIDE PRN ML: 9 INJECTION, SOLUTION INTRAVENOUS at 19:23

## 2017-05-20 RX ADMIN — LEVALBUTEROL HYDROCHLORIDE SCH MG: 1.25 SOLUTION RESPIRATORY (INHALATION) at 19:32

## 2017-05-20 RX ADMIN — IPRATROPIUM BROMIDE SCH MG: 0.5 SOLUTION RESPIRATORY (INHALATION) at 23:52

## 2017-05-20 RX ADMIN — METHYLPREDNISOLONE SODIUM SUCCINATE SCH MG: 125 INJECTION, POWDER, FOR SOLUTION INTRAMUSCULAR; INTRAVENOUS at 17:29

## 2017-05-20 RX ADMIN — LANSOPRAZOLE SCH MG: 30 TABLET, ORALLY DISINTEGRATING, DELAYED RELEASE ORAL at 05:04

## 2017-05-20 RX ADMIN — LEVALBUTEROL HYDROCHLORIDE SCH MG: 1.25 SOLUTION RESPIRATORY (INHALATION) at 04:13

## 2017-05-20 RX ADMIN — FLUTICASONE PROPIONATE AND SALMETEROL SCH INH: 50; 500 POWDER RESPIRATORY (INHALATION) at 21:16

## 2017-05-20 RX ADMIN — SODIUM CHLORIDE PRN ML: 9 INJECTION, SOLUTION INTRAVENOUS at 02:51

## 2017-05-20 RX ADMIN — LEVALBUTEROL HYDROCHLORIDE SCH MG: 1.25 SOLUTION RESPIRATORY (INHALATION) at 11:35

## 2017-05-20 RX ADMIN — LEVOFLOXACIN SCH MG: 750 TABLET, FILM COATED ORAL at 09:43

## 2017-05-20 RX ADMIN — DOCUSATE SODIUM SCH MG: 100 CAPSULE, LIQUID FILLED ORAL at 09:44

## 2017-05-20 RX ADMIN — IPRATROPIUM BROMIDE SCH MG: 0.5 SOLUTION RESPIRATORY (INHALATION) at 04:13

## 2017-05-20 RX ADMIN — ACETAMINOPHEN PRN MG: 325 TABLET ORAL at 05:11

## 2017-05-20 RX ADMIN — LEVALBUTEROL HYDROCHLORIDE SCH MG: 1.25 SOLUTION RESPIRATORY (INHALATION) at 23:52

## 2017-05-20 RX ADMIN — FLUTICASONE PROPIONATE AND SALMETEROL SCH INH: 50; 500 POWDER RESPIRATORY (INHALATION) at 10:53

## 2017-05-20 NOTE — PDOC PROGRESS REPORT
Subjective


Progress Note for:: 05/20/17


Subjective:: 


Breathing better. Wheezing is less. Has chronic wheezing and goes to see MD 

only when it gets worse. No temp spikes, CP, N/V. No diarrhea. Being weaned 

from BIPAP. Abdominal muscles still sore from coughing.





Physical Exam


Vital Signs: 


 











Temp Pulse Resp BP Pulse Ox


 


 97.4 F   83   28 H  116/61   93 


 


 05/20/17 07:18  05/20/17 07:18  05/20/17 07:18  05/20/17 07:18  05/20/17 07:18








 Intake & Output











 05/19/17 05/20/17 05/21/17





 06:59 06:59 06:59


 


Intake Total  1740 


 


Output Total 1900 1575 


 


Balance -1900 165 


 


Weight  75.9 kg 











General appearance: PRESENT: mild distress, other - Speaks in interrupted 

sentences but improved from admission.


Head exam: PRESENT: normocephalic


Eye exam: PRESENT: EOMI


Mouth exam: PRESENT: moist, neck supple


Neck exam: ABSENT: JVD


Respiratory exam: PRESENT: rhonchi - scattered, wheezes - expiratory


Cardiovascular exam: PRESENT: irregular rhythm, RRR.  ABSENT: gallop


GI/Abdominal exam: PRESENT: hypoactive bowel sounds, soft.  ABSENT: distended, 

tenderness


Extremities exam: ABSENT: pedal edema


Neurological exam: PRESENT: alert, awake, oriented to situation


Skin exam: PRESENT: dry, warm.  ABSENT: cyanosis





Results


Laboratory Results: 


 





 05/19/17 06:48 





 05/19/17 06:48 








Impressions: 


 





Chest/Abdomen CTA  05/18/17 11:17


IMPRESSION:  NORMAL CTA OF THE CHEST. NO PULMONARY EMBOLI.


 














Assessment & Plan





- Diagnosis


(1) Acute respiratory failure


Qualifiers: 


     Respiratory failure complication: hypoxia and hypercapnia        Qualified 

Code(s): J96.01 - Acute respiratory failure with hypoxia  


Is this a current diagnosis for this admission?: Yes





(2) COPD with exacerbation


Is this a current diagnosis for this admission?: Yes





(3) Elevated hematocrit


Is this a current diagnosis for this admission?: Yes





(4) CHF (congestive heart failure)


Qualifiers: 


     Congestive heart failure type: diastolic     Congestive heart failure 

chronicity: chronic        Qualified Code(s): I50.32 - Chronic diastolic (

congestive) heart failure  


Is this a current diagnosis for this admission?: Yes





(5) Alcohol abuse


Is this a current diagnosis for this admission?: Yes





(6) Pulmonary hypertension


Is this a current diagnosis for this admission?: Yes





(7) Thrombocytopenia


Is this a current diagnosis for this admission?: Yes








- Time


Time Spent with patient: 25-34 minutes





- Plan Summary


Plan Summary: 


Continue IV steroids. Continue RTC nebulizers. Begin advair. Wean BIPAP per RT 

protocol. OOB. PT.

## 2017-05-21 RX ADMIN — SODIUM CHLORIDE PRN ML: 9 INJECTION, SOLUTION INTRAVENOUS at 12:24

## 2017-05-21 RX ADMIN — LEVALBUTEROL HYDROCHLORIDE SCH MG: 1.25 SOLUTION RESPIRATORY (INHALATION) at 15:56

## 2017-05-21 RX ADMIN — IPRATROPIUM BROMIDE SCH MG: 0.5 SOLUTION RESPIRATORY (INHALATION) at 15:56

## 2017-05-21 RX ADMIN — IPRATROPIUM BROMIDE SCH MG: 0.5 SOLUTION RESPIRATORY (INHALATION) at 11:54

## 2017-05-21 RX ADMIN — LEVALBUTEROL HYDROCHLORIDE SCH MG: 1.25 SOLUTION RESPIRATORY (INHALATION) at 08:09

## 2017-05-21 RX ADMIN — IPRATROPIUM BROMIDE SCH MG: 0.5 SOLUTION RESPIRATORY (INHALATION) at 08:09

## 2017-05-21 RX ADMIN — FLUTICASONE PROPIONATE AND SALMETEROL SCH INH: 50; 500 POWDER RESPIRATORY (INHALATION) at 21:21

## 2017-05-21 RX ADMIN — FONDAPARINUX SODIUM SCH MG: 2.5 INJECTION, SOLUTION SUBCUTANEOUS at 10:04

## 2017-05-21 RX ADMIN — LEVALBUTEROL HYDROCHLORIDE SCH MG: 1.25 SOLUTION RESPIRATORY (INHALATION) at 19:39

## 2017-05-21 RX ADMIN — METHYLPREDNISOLONE SODIUM SUCCINATE SCH MG: 125 INJECTION, POWDER, FOR SOLUTION INTRAMUSCULAR; INTRAVENOUS at 05:42

## 2017-05-21 RX ADMIN — IPRATROPIUM BROMIDE SCH MG: 0.5 SOLUTION RESPIRATORY (INHALATION) at 23:57

## 2017-05-21 RX ADMIN — DOCUSATE SODIUM SCH MG: 100 CAPSULE, LIQUID FILLED ORAL at 17:27

## 2017-05-21 RX ADMIN — LEVALBUTEROL HYDROCHLORIDE SCH MG: 1.25 SOLUTION RESPIRATORY (INHALATION) at 23:57

## 2017-05-21 RX ADMIN — LEVALBUTEROL HYDROCHLORIDE SCH MG: 1.25 SOLUTION RESPIRATORY (INHALATION) at 03:25

## 2017-05-21 RX ADMIN — DOCUSATE SODIUM SCH MG: 100 CAPSULE, LIQUID FILLED ORAL at 10:07

## 2017-05-21 RX ADMIN — IPRATROPIUM BROMIDE SCH MG: 0.5 SOLUTION RESPIRATORY (INHALATION) at 19:39

## 2017-05-21 RX ADMIN — IPRATROPIUM BROMIDE SCH MG: 0.5 SOLUTION RESPIRATORY (INHALATION) at 03:25

## 2017-05-21 RX ADMIN — LEVOFLOXACIN SCH MG: 750 TABLET, FILM COATED ORAL at 10:08

## 2017-05-21 RX ADMIN — LANSOPRAZOLE SCH MG: 30 TABLET, ORALLY DISINTEGRATING, DELAYED RELEASE ORAL at 05:42

## 2017-05-21 RX ADMIN — LEVALBUTEROL HYDROCHLORIDE SCH MG: 1.25 SOLUTION RESPIRATORY (INHALATION) at 11:54

## 2017-05-21 RX ADMIN — ACETAMINOPHEN PRN MG: 325 TABLET ORAL at 10:07

## 2017-05-21 RX ADMIN — FLUTICASONE PROPIONATE AND SALMETEROL SCH INH: 50; 500 POWDER RESPIRATORY (INHALATION) at 10:11

## 2017-05-21 NOTE — PDOC PROGRESS REPORT
Subjective


Progress Note for:: 05/21/17


Subjective:: 


Still requiring BIPAP. On nasal canula goes up to 5L and O2 sat at rest > 90 

but on activity, quickly drops. Denies any increasing SOB, wheezing, N/V/CP.





Physical Exam


Vital Signs: 


 











Temp Pulse Resp BP Pulse Ox


 


 97.6 F   75   18   133/75 H  99 


 


 05/21/17 07:15  05/21/17 07:15  05/21/17 07:15  05/21/17 07:15  05/21/17 07:15








 Intake & Output











 05/20/17 05/21/17 05/22/17





 06:59 06:59 06:59


 


Intake Total 1740 2826 


 


Output Total 1575 1475 


 


Balance 165 1351 


 


Weight 75.9 kg 76.1 kg 











General appearance: PRESENT: no acute distress, cooperative, other - on BIPAP


Head exam: PRESENT: normocephalic


Eye exam: PRESENT: EOMI


Mouth exam: PRESENT: moist, neck supple


Neck exam: ABSENT: JVD


Respiratory exam: PRESENT: decreased breath sounds, wheezes - mild


Cardiovascular exam: PRESENT: RRR.  ABSENT: gallop


GI/Abdominal exam: PRESENT: soft.  ABSENT: distended, tenderness


Extremities exam: ABSENT: pedal edema


Neurological exam: PRESENT: alert, awake, oriented to situation


Skin exam: PRESENT: dry, warm.  ABSENT: cyanosis





Results


Laboratory Results: 


 





 05/19/17 06:48 





 05/19/17 06:48 





 





05/18/17 17:30   Sputum   Gram Stain - Final


05/18/17 17:30   Sputum   Sputum Culture - Final


                            NORMAL BRENNAN








Impressions: 


 





Chest/Abdomen CTA  05/18/17 11:17


IMPRESSION:  NORMAL CTA OF THE CHEST. NO PULMONARY EMBOLI.


 








Chest X-Ray  05/20/17 06:00


IMPRESSION:  COPD.  NO ACUTE RADIOGRAPHIC FINDING IN THE CHEST.


 














Assessment & Plan





- Diagnosis


(1) Acute respiratory failure


Qualifiers: 


     Respiratory failure complication: hypoxia and hypercapnia        Qualified 

Code(s): J96.01 - Acute respiratory failure with hypoxia  


Is this a current diagnosis for this admission?: Yes





(2) COPD with exacerbation


Is this a current diagnosis for this admission?: Yes





(3) Elevated hematocrit


Is this a current diagnosis for this admission?: Yes





(4) CHF (congestive heart failure)


Qualifiers: 


     Congestive heart failure type: diastolic     Congestive heart failure 

chronicity: chronic        Qualified Code(s): I50.32 - Chronic diastolic (

congestive) heart failure  


Is this a current diagnosis for this admission?: Yes





(5) Alcohol abuse


Is this a current diagnosis for this admission?: Yes





(6) Pulmonary hypertension


Is this a current diagnosis for this admission?: Yes





(7) Thrombocytopenia


Is this a current diagnosis for this admission?: Yes








- Time


Time Spent with patient: 25-34 minutes





- Plan Summary


Plan Summary: 


D/C IV steroids, begin oral prednisone, consult D/C planner for home BIPAP. 

Cont. other medications. Follow up xray did not reveal infiltrate post 

hydration.

## 2017-05-22 RX ADMIN — LEVALBUTEROL HYDROCHLORIDE SCH MG: 1.25 SOLUTION RESPIRATORY (INHALATION) at 07:53

## 2017-05-22 RX ADMIN — LEVALBUTEROL HYDROCHLORIDE SCH MG: 1.25 SOLUTION RESPIRATORY (INHALATION) at 19:43

## 2017-05-22 RX ADMIN — IPRATROPIUM BROMIDE SCH MG: 0.5 SOLUTION RESPIRATORY (INHALATION) at 19:43

## 2017-05-22 RX ADMIN — LEVALBUTEROL HYDROCHLORIDE SCH MG: 1.25 SOLUTION RESPIRATORY (INHALATION) at 03:47

## 2017-05-22 RX ADMIN — FONDAPARINUX SODIUM SCH MG: 2.5 INJECTION, SOLUTION SUBCUTANEOUS at 09:30

## 2017-05-22 RX ADMIN — SODIUM CHLORIDE PRN ML: 9 INJECTION, SOLUTION INTRAVENOUS at 03:12

## 2017-05-22 RX ADMIN — IPRATROPIUM BROMIDE SCH MG: 0.5 SOLUTION RESPIRATORY (INHALATION) at 11:09

## 2017-05-22 RX ADMIN — LANSOPRAZOLE SCH MG: 30 TABLET, ORALLY DISINTEGRATING, DELAYED RELEASE ORAL at 05:55

## 2017-05-22 RX ADMIN — FLUTICASONE PROPIONATE AND SALMETEROL SCH INH: 50; 500 POWDER RESPIRATORY (INHALATION) at 09:31

## 2017-05-22 RX ADMIN — DOCUSATE SODIUM SCH MG: 100 CAPSULE, LIQUID FILLED ORAL at 09:29

## 2017-05-22 RX ADMIN — DOCUSATE SODIUM SCH MG: 100 CAPSULE, LIQUID FILLED ORAL at 17:25

## 2017-05-22 RX ADMIN — LEVALBUTEROL HYDROCHLORIDE SCH MG: 1.25 SOLUTION RESPIRATORY (INHALATION) at 11:09

## 2017-05-22 RX ADMIN — LEVOFLOXACIN SCH MG: 750 TABLET, FILM COATED ORAL at 09:29

## 2017-05-22 RX ADMIN — LEVALBUTEROL HYDROCHLORIDE SCH MG: 1.25 SOLUTION RESPIRATORY (INHALATION) at 23:51

## 2017-05-22 RX ADMIN — IPRATROPIUM BROMIDE SCH MG: 0.5 SOLUTION RESPIRATORY (INHALATION) at 16:13

## 2017-05-22 RX ADMIN — PREDNISONE SCH MG: 20 TABLET ORAL at 09:29

## 2017-05-22 RX ADMIN — FLUTICASONE PROPIONATE AND SALMETEROL SCH INH: 50; 500 POWDER RESPIRATORY (INHALATION) at 21:25

## 2017-05-22 RX ADMIN — IPRATROPIUM BROMIDE SCH MG: 0.5 SOLUTION RESPIRATORY (INHALATION) at 07:53

## 2017-05-22 RX ADMIN — IPRATROPIUM BROMIDE SCH MG: 0.5 SOLUTION RESPIRATORY (INHALATION) at 23:51

## 2017-05-22 RX ADMIN — LEVALBUTEROL HYDROCHLORIDE SCH MG: 1.25 SOLUTION RESPIRATORY (INHALATION) at 16:13

## 2017-05-22 RX ADMIN — IPRATROPIUM BROMIDE SCH MG: 0.5 SOLUTION RESPIRATORY (INHALATION) at 03:47

## 2017-05-22 NOTE — PDOC PROGRESS REPORT
Subjective


Progress Note for:: 05/22/17


Subjective:: 


Patient continues to feel better, he has chronic wheezing and states that he is 

getting close to baseline.  He was tried to be off BiPAP yesterday but unable 

to.  This morning he is able to tolerate, just nasal cannula oxygen without the 

BiPAP.  Initially, he wants to have a home BiPAP now he is having second 

thoughts, as he does not want to be  dependent on it.  Denies any chest pain, 

nausea vomiting or diarrhea.








Physical Exam


Vital Signs: 


 











Temp Pulse Resp BP Pulse Ox


 


 98.9 F   82   16   146/86 H  89 L


 


 05/22/17 07:45  05/22/17 07:53  05/22/17 07:53  05/22/17 07:45  05/22/17 07:53








 Intake & Output











 05/21/17 05/22/17 05/23/17





 06:59 06:59 06:59


 


Intake Total 2826 3100 


 


Output Total 1475 4750 


 


Balance 1351 -1650 


 


Weight 76.1 kg 74 kg 











General appearance: PRESENT: no acute distress, cooperative


Head exam: PRESENT: normocephalic


Eye exam: PRESENT: EOMI


Mouth exam: PRESENT: moist, neck supple


Neck exam: ABSENT: JVD


Respiratory exam: PRESENT: decreased breath sounds, rhonchi - Occasional 

bilateral, wheezes - Few expiratory wheezing bilateral


Cardiovascular exam: PRESENT: RRR.  ABSENT: gallop


GI/Abdominal exam: PRESENT: normal bowel sounds, soft.  ABSENT: distended, 

tenderness


Extremities exam: ABSENT: pedal edema


Neurological exam: PRESENT: alert, awake, oriented to person, oriented to place

, oriented to time, oriented to situation


Skin exam: PRESENT: dry, warm.  ABSENT: cyanosis





Results


Laboratory Results: 


 





 05/19/17 06:48 





 05/19/17 06:48 





 





05/18/17 17:30   Sputum   Gram Stain - Final


05/18/17 17:30   Sputum   Sputum Culture - Final


                            NORMAL BRENNAN








Impressions: 


 





Chest/Abdomen CTA  05/18/17 11:17


IMPRESSION:  NORMAL CTA OF THE CHEST. NO PULMONARY EMBOLI.


 








Chest X-Ray  05/20/17 06:00


IMPRESSION:  COPD.  NO ACUTE RADIOGRAPHIC FINDING IN THE CHEST.


 














Assessment & Plan





- Diagnosis


(1) Acute respiratory failure


Qualifiers: 


     Respiratory failure complication: hypoxia and hypercapnia        Qualified 

Code(s): J96.01 - Acute respiratory failure with hypoxia  


Is this a current diagnosis for this admission?: Yes





(2) COPD with exacerbation


Is this a current diagnosis for this admission?: Yes





(3) Elevated hematocrit


Is this a current diagnosis for this admission?: Yes





(4) CHF (congestive heart failure)


Qualifiers: 


     Congestive heart failure type: diastolic     Congestive heart failure 

chronicity: chronic        Qualified Code(s): I50.32 - Chronic diastolic (

congestive) heart failure  


Is this a current diagnosis for this admission?: Yes





(5) Alcohol abuse


Is this a current diagnosis for this admission?: Yes





(6) Pulmonary hypertension


Is this a current diagnosis for this admission?: Yes





(7) Thrombocytopenia


Is this a current diagnosis for this admission?: Yes








- Time


Time Spent with patient: 25-34 minutes





- Plan Summary


Plan Summary: 


Transition to oral steroids discontinue intravenous fluids.  Continue oral 

antibiotic.  Begin physical therapy.  Patient is already on home oxygen.  

Discharge planner has been consulted for home BiPAP, in case he decides to 

pursue it.  Continue supportive care.  Routine lab works in the morning.

## 2017-05-23 LAB
ANION GAP SERPL CALC-SCNC: 7 MMOL/L (ref 5–19)
BUN SERPL-MCNC: 14 MG/DL (ref 7–20)
CALCIUM: 9.3 MG/DL (ref 8.4–10.2)
CHLORIDE SERPL-SCNC: 88 MMOL/L (ref 98–107)
CO2 SERPL-SCNC: 44 MMOL/L (ref 22–30)
CREAT SERPL-MCNC: 0.58 MG/DL (ref 0.52–1.25)
ERYTHROCYTE [DISTWIDTH] IN BLOOD BY AUTOMATED COUNT: 14.2 % (ref 11.5–14)
GLUCOSE SERPL-MCNC: 91 MG/DL (ref 75–110)
HCT VFR BLD CALC: 55.5 % (ref 37.9–51)
HGB BLD-MCNC: 17.9 G/DL (ref 13.5–17)
HGB HCT DIFFERENCE: -1.8
MCH RBC QN AUTO: 32.8 PG (ref 27–33.4)
MCHC RBC AUTO-ENTMCNC: 32.2 G/DL (ref 32–36)
MCV RBC AUTO: 102 FL (ref 80–97)
POTASSIUM SERPL-SCNC: 3.6 MMOL/L (ref 3.6–5)
RBC # BLD AUTO: 5.45 10^6/UL (ref 4.35–5.55)
SODIUM SERPL-SCNC: 139.1 MMOL/L (ref 137–145)
WBC # BLD AUTO: 5.6 10^3/UL (ref 4–10.5)

## 2017-05-23 RX ADMIN — DOCUSATE SODIUM SCH MG: 100 CAPSULE, LIQUID FILLED ORAL at 09:31

## 2017-05-23 RX ADMIN — LEVALBUTEROL HYDROCHLORIDE SCH MG: 1.25 SOLUTION RESPIRATORY (INHALATION) at 19:51

## 2017-05-23 RX ADMIN — LEVALBUTEROL HYDROCHLORIDE SCH MG: 1.25 SOLUTION RESPIRATORY (INHALATION) at 15:55

## 2017-05-23 RX ADMIN — LANSOPRAZOLE SCH MG: 30 TABLET, ORALLY DISINTEGRATING, DELAYED RELEASE ORAL at 05:59

## 2017-05-23 RX ADMIN — LEVALBUTEROL HYDROCHLORIDE SCH MG: 1.25 SOLUTION RESPIRATORY (INHALATION) at 08:12

## 2017-05-23 RX ADMIN — ACETAMINOPHEN PRN MG: 325 TABLET ORAL at 09:37

## 2017-05-23 RX ADMIN — LEVOFLOXACIN SCH MG: 750 TABLET, FILM COATED ORAL at 09:32

## 2017-05-23 RX ADMIN — IPRATROPIUM BROMIDE SCH MG: 0.5 SOLUTION RESPIRATORY (INHALATION) at 19:51

## 2017-05-23 RX ADMIN — IPRATROPIUM BROMIDE SCH MG: 0.5 SOLUTION RESPIRATORY (INHALATION) at 08:12

## 2017-05-23 RX ADMIN — IPRATROPIUM BROMIDE SCH MG: 0.5 SOLUTION RESPIRATORY (INHALATION) at 23:57

## 2017-05-23 RX ADMIN — LEVALBUTEROL HYDROCHLORIDE SCH MG: 1.25 SOLUTION RESPIRATORY (INHALATION) at 23:57

## 2017-05-23 RX ADMIN — DOCUSATE SODIUM SCH MG: 100 CAPSULE, LIQUID FILLED ORAL at 17:49

## 2017-05-23 RX ADMIN — FLUTICASONE PROPIONATE AND SALMETEROL SCH INH: 50; 500 POWDER RESPIRATORY (INHALATION) at 09:34

## 2017-05-23 RX ADMIN — IPRATROPIUM BROMIDE SCH MG: 0.5 SOLUTION RESPIRATORY (INHALATION) at 11:31

## 2017-05-23 RX ADMIN — FLUTICASONE PROPIONATE AND SALMETEROL SCH INH: 50; 500 POWDER RESPIRATORY (INHALATION) at 22:14

## 2017-05-23 RX ADMIN — IPRATROPIUM BROMIDE SCH MG: 0.5 SOLUTION RESPIRATORY (INHALATION) at 03:43

## 2017-05-23 RX ADMIN — LEVALBUTEROL HYDROCHLORIDE SCH MG: 1.25 SOLUTION RESPIRATORY (INHALATION) at 03:43

## 2017-05-23 RX ADMIN — IPRATROPIUM BROMIDE SCH MG: 0.5 SOLUTION RESPIRATORY (INHALATION) at 15:55

## 2017-05-23 RX ADMIN — PREDNISONE SCH MG: 20 TABLET ORAL at 09:31

## 2017-05-23 RX ADMIN — LEVALBUTEROL HYDROCHLORIDE SCH MG: 1.25 SOLUTION RESPIRATORY (INHALATION) at 11:31

## 2017-05-23 RX ADMIN — FONDAPARINUX SODIUM SCH MG: 2.5 INJECTION, SOLUTION SUBCUTANEOUS at 09:33

## 2017-05-23 NOTE — PDOC PROGRESS REPORT
Subjective


Progress Note for:: 05/23/17


Subjective:: 


Currently on a BiPAP machine but reports he is feeling less short of breath





Physical Exam


Vital Signs: 


 











Temp Pulse Resp BP Pulse Ox


 


 97.8 F   96   12   144/84 H  93 


 


 05/23/17 11:16  05/23/17 11:16  05/23/17 11:31  05/23/17 11:16  05/23/17 11:16








 Intake & Output











 05/22/17 05/23/17 05/24/17





 06:59 06:59 06:59


 


Intake Total 3100 1094 


 


Output Total 4750 4400 


 


Balance -1650 -3306 


 


Weight 74 kg 73.8 kg 











General appearance: PRESENT: no acute distress


Eye exam: PRESENT: conjunctiva pink.  ABSENT: scleral icterus


Mouth exam: PRESENT: moist, tongue midline


Neck exam: ABSENT: JVD


Respiratory exam: PRESENT: wheezes - Scattered bilateral expiratory wheezes.  

ABSENT: rales, rhonchi


Cardiovascular exam: PRESENT: RRR.  ABSENT: diastolic murmur, rubs, systolic 

murmur


GI/Abdominal exam: PRESENT: normal bowel sounds, soft.  ABSENT: distended, 

guarding, mass, organolmegaly, rebound, tenderness


Extremities exam: ABSENT: calf tenderness, clubbing, pedal edema


Neurological exam: PRESENT: alert, awake, oriented to person, oriented to place

, oriented to time, oriented to situation, CN II-XII grossly intact.  ABSENT: 

motor sensory deficit


Psychiatric exam: PRESENT: appropriate affect


Skin exam: PRESENT: dry, intact, warm.  ABSENT: cyanosis, rash





Results


Laboratory Results: 


 





 05/23/17 04:04 





 05/23/17 04:04 





 











  05/23/17 05/23/17





  04:04 04:04


 


WBC  5.6 


 


RBC  5.45 


 


Hgb  17.9 H 


 


Hct  55.5 H 


 


MCV  102 H 


 


MCH  32.8 


 


MCHC  32.2 


 


RDW  14.2 H 


 


Plt Count  103 L 


 


Sodium   139.1


 


Potassium   3.6


 


Chloride   88 L


 


Carbon Dioxide   44 H*


 


Anion Gap   7


 


BUN   14


 


Creatinine   0.58


 


Est GFR ( Amer)   > 60


 


Est GFR (Non-Af Amer)   > 60


 


Glucose   91


 


Calcium   9.3











Impressions: 


 





Chest/Abdomen CTA  05/18/17 11:17


IMPRESSION:  NORMAL CTA OF THE CHEST. NO PULMONARY EMBOLI.


 








Chest X-Ray  05/20/17 06:00


IMPRESSION:  COPD.  NO ACUTE RADIOGRAPHIC FINDING IN THE CHEST.


 














Assessment & Plan





- Diagnosis


(1) Acute respiratory failure


Qualifiers: 


     Respiratory failure complication: hypoxia and hypercapnia        Qualified 

Code(s): J96.01 - Acute respiratory failure with hypoxia  


Is this a current diagnosis for this admission?: YesPlan: 


There is a 2 acute COPD exacerbation.  Patient is still having some wheezing 

but overall has shown improvement.  We will continue with steroids nebulizers 

and BiPAP








(2) COPD with exacerbation


Is this a current diagnosis for this admission?: YesPlan: 


Continue with steroids, nebulizers, and BiPAP.








(3) CHF (congestive heart failure)


Qualifiers: 


     Congestive heart failure type: diastolic     Congestive heart failure 

chronicity: chronic        Qualified Code(s): I50.32 - Chronic diastolic (

congestive) heart failure  


Is this a current diagnosis for this admission?: YesPlan: 


Chronic diastolic congestive heart failure.  He appears to be euvolemic 

currently.








(4) Elevated hematocrit


Is this a current diagnosis for this admission?: YesPlan: 


Secondary to his underlying lung disease.








(5) PAF (paroxysmal atrial fibrillation)


Is this a current diagnosis for this admission?: YesPlan: 


Is in a regular rhythm today








(6) Pulmonary hypertension


Is this a current diagnosis for this admission?: Yes











- Time


Time Spent with patient: 25-34 minutes





- Inpatient Certification


Medical Necessity: Need Close Monitoring Due to Risk of Patient Decompensation

## 2017-05-24 LAB
ANION GAP SERPL CALC-SCNC: 10 MMOL/L (ref 5–19)
BASOPHILS # BLD AUTO: 0 10^3/UL (ref 0–0.2)
BASOPHILS NFR BLD AUTO: 0.1 % (ref 0–2)
BUN SERPL-MCNC: 13 MG/DL (ref 7–20)
CALCIUM: 9.4 MG/DL (ref 8.4–10.2)
CHLORIDE SERPL-SCNC: 91 MMOL/L (ref 98–107)
CO2 SERPL-SCNC: 38 MMOL/L (ref 22–30)
CREAT SERPL-MCNC: 0.62 MG/DL (ref 0.52–1.25)
EOSINOPHIL # BLD AUTO: 0.1 10^3/UL (ref 0–0.6)
EOSINOPHIL NFR BLD AUTO: 1.1 % (ref 0–6)
ERYTHROCYTE [DISTWIDTH] IN BLOOD BY AUTOMATED COUNT: 14.2 % (ref 11.5–14)
GLUCOSE SERPL-MCNC: 93 MG/DL (ref 75–110)
HCT VFR BLD CALC: 55.4 % (ref 37.9–51)
HGB BLD-MCNC: 18.4 G/DL (ref 13.5–17)
HGB HCT DIFFERENCE: -0.2
LYMPHOCYTES # BLD AUTO: 1.2 10^3/UL (ref 0.5–4.7)
LYMPHOCYTES NFR BLD AUTO: 21.7 % (ref 13–45)
MCH RBC QN AUTO: 33.4 PG (ref 27–33.4)
MCHC RBC AUTO-ENTMCNC: 33.2 G/DL (ref 32–36)
MCV RBC AUTO: 101 FL (ref 80–97)
MONOCYTES # BLD AUTO: 0.8 10^3/UL (ref 0.1–1.4)
MONOCYTES NFR BLD AUTO: 14.2 % (ref 3–13)
NEUTROPHILS # BLD AUTO: 3.4 10^3/UL (ref 1.7–8.2)
NEUTS SEG NFR BLD AUTO: 62.9 % (ref 42–78)
POTASSIUM SERPL-SCNC: 3.3 MMOL/L (ref 3.6–5)
RBC # BLD AUTO: 5.51 10^6/UL (ref 4.35–5.55)
SODIUM SERPL-SCNC: 138.9 MMOL/L (ref 137–145)
WBC # BLD AUTO: 5.3 10^3/UL (ref 4–10.5)

## 2017-05-24 RX ADMIN — LEVALBUTEROL HYDROCHLORIDE SCH MG: 1.25 SOLUTION RESPIRATORY (INHALATION) at 16:28

## 2017-05-24 RX ADMIN — POTASSIUM CHLORIDE SCH MEQ: 750 TABLET, FILM COATED, EXTENDED RELEASE ORAL at 09:50

## 2017-05-24 RX ADMIN — ACETAMINOPHEN PRN MG: 325 TABLET ORAL at 17:41

## 2017-05-24 RX ADMIN — DOCUSATE SODIUM SCH MG: 100 CAPSULE, LIQUID FILLED ORAL at 17:41

## 2017-05-24 RX ADMIN — IPRATROPIUM BROMIDE SCH MG: 0.5 SOLUTION RESPIRATORY (INHALATION) at 07:56

## 2017-05-24 RX ADMIN — LEVALBUTEROL HYDROCHLORIDE SCH MG: 1.25 SOLUTION RESPIRATORY (INHALATION) at 03:39

## 2017-05-24 RX ADMIN — IPRATROPIUM BROMIDE SCH MG: 0.5 SOLUTION RESPIRATORY (INHALATION) at 12:11

## 2017-05-24 RX ADMIN — IPRATROPIUM BROMIDE SCH MG: 0.5 SOLUTION RESPIRATORY (INHALATION) at 19:35

## 2017-05-24 RX ADMIN — IPRATROPIUM BROMIDE SCH MG: 0.5 SOLUTION RESPIRATORY (INHALATION) at 03:39

## 2017-05-24 RX ADMIN — LANSOPRAZOLE SCH MG: 30 TABLET, ORALLY DISINTEGRATING, DELAYED RELEASE ORAL at 05:51

## 2017-05-24 RX ADMIN — LEVALBUTEROL HYDROCHLORIDE SCH MG: 1.25 SOLUTION RESPIRATORY (INHALATION) at 23:19

## 2017-05-24 RX ADMIN — LEVALBUTEROL HYDROCHLORIDE SCH MG: 1.25 SOLUTION RESPIRATORY (INHALATION) at 07:56

## 2017-05-24 RX ADMIN — POTASSIUM CHLORIDE SCH MEQ: 750 TABLET, FILM COATED, EXTENDED RELEASE ORAL at 21:14

## 2017-05-24 RX ADMIN — PREDNISONE SCH MG: 20 TABLET ORAL at 09:50

## 2017-05-24 RX ADMIN — LEVALBUTEROL HYDROCHLORIDE SCH MG: 1.25 SOLUTION RESPIRATORY (INHALATION) at 12:11

## 2017-05-24 RX ADMIN — FLUTICASONE PROPIONATE AND SALMETEROL SCH INH: 50; 500 POWDER RESPIRATORY (INHALATION) at 21:12

## 2017-05-24 RX ADMIN — FLUTICASONE PROPIONATE AND SALMETEROL SCH INH: 50; 500 POWDER RESPIRATORY (INHALATION) at 10:27

## 2017-05-24 RX ADMIN — DOCUSATE SODIUM SCH MG: 100 CAPSULE, LIQUID FILLED ORAL at 09:49

## 2017-05-24 RX ADMIN — IPRATROPIUM BROMIDE SCH MG: 0.5 SOLUTION RESPIRATORY (INHALATION) at 16:28

## 2017-05-24 RX ADMIN — IPRATROPIUM BROMIDE SCH MG: 0.5 SOLUTION RESPIRATORY (INHALATION) at 23:18

## 2017-05-24 RX ADMIN — LEVALBUTEROL HYDROCHLORIDE SCH MG: 1.25 SOLUTION RESPIRATORY (INHALATION) at 19:34

## 2017-05-24 RX ADMIN — LEVOFLOXACIN SCH MG: 750 TABLET, FILM COATED ORAL at 10:00

## 2017-05-24 RX ADMIN — FONDAPARINUX SODIUM SCH MG: 2.5 INJECTION, SOLUTION SUBCUTANEOUS at 10:05

## 2017-05-24 NOTE — PDOC PROGRESS REPORT
Subjective


Progress Note for:: 05/24/17


Subjective:: 


Complains of a nonproductive cough.





Physical Exam


Vital Signs: 


 











Temp Pulse Resp BP Pulse Ox


 


 98.0 F   84   18   131/94 H  90 L


 


 05/24/17 03:41  05/24/17 07:56  05/24/17 07:56  05/24/17 03:41  05/24/17 07:56








 Intake & Output











 05/23/17 05/24/17 05/25/17





 06:59 06:59 06:59


 


Intake Total 1094 1516 


 


Output Total 4400 1125 


 


Balance -3306 391 


 


Weight 73.8 kg 74 kg 











General appearance: PRESENT: no acute distress


Eye exam: PRESENT: conjunctiva pink.  ABSENT: scleral icterus


Mouth exam: PRESENT: moist, tongue midline


Neck exam: ABSENT: carotid bruit, JVD, lymphadenopathy, thyromegaly


Respiratory exam: PRESENT: wheezes - Worse on the left.  ABSENT: rales, rhonchi


Cardiovascular exam: PRESENT: RRR.  ABSENT: diastolic murmur, rubs, systolic 

murmur


GI/Abdominal exam: PRESENT: normal bowel sounds, soft.  ABSENT: distended, 

guarding, mass, organolmegaly, rebound, tenderness


Extremities exam: ABSENT: calf tenderness, clubbing, pedal edema


Neurological exam: PRESENT: alert, awake, oriented to person, oriented to place

, oriented to time, oriented to situation, CN II-XII grossly intact.  ABSENT: 

motor sensory deficit


Psychiatric exam: PRESENT: appropriate affect


Skin exam: PRESENT: dry, intact, warm.  ABSENT: cyanosis, rash





Results


Laboratory Results: 


 





 05/24/17 03:37 





 05/24/17 03:37 





 











  05/24/17 05/24/17





  03:37 03:37


 


WBC  5.3 


 


RBC  5.51 


 


Hgb  18.4 H 


 


Hct  55.4 H 


 


MCV  101 H 


 


MCH  33.4 


 


MCHC  33.2 


 


RDW  14.2 H 


 


Plt Count  110 L 


 


Seg Neutrophils %  62.9 


 


Lymphocytes %  21.7 


 


Monocytes %  14.2 H 


 


Eosinophils %  1.1 


 


Basophils %  0.1 


 


Absolute Neutrophils  3.4 


 


Absolute Lymphocytes  1.2 


 


Absolute Monocytes  0.8 


 


Absolute Eosinophils  0.1 


 


Absolute Basophils  0.0 


 


Sodium   138.9


 


Potassium   3.3 L


 


Chloride   91 L


 


Carbon Dioxide   38 H


 


Anion Gap   10


 


BUN   13


 


Creatinine   0.62


 


Est GFR ( Amer)   > 60


 


Est GFR (Non-Af Amer)   > 60


 


Glucose   93


 


Calcium   9.4











Impressions: 


 





Chest/Abdomen CTA  05/18/17 11:17


IMPRESSION:  NORMAL CTA OF THE CHEST. NO PULMONARY EMBOLI.


 








Chest X-Ray  05/20/17 06:00


IMPRESSION:  COPD.  NO ACUTE RADIOGRAPHIC FINDING IN THE CHEST.


 














Assessment & Plan





- Diagnosis


(1) Acute respiratory failure


Qualifiers: 


     Respiratory failure complication: hypoxia and hypercapnia        Qualified 

Code(s): J96.01 - Acute respiratory failure with hypoxia  


Is this a current diagnosis for this admission?: YesPlan: 





Secondary to acute COPD exacerbation.  Patient continues to have some wheezing 

but overall has shown improvement.  We will continue with steroids nebulizers 

and BiPAP








(2) COPD with exacerbation


Is this a current diagnosis for this admission?: YesPlan: 


Continue with steroids, nebulizers, and BiPAP.








(3) CHF (congestive heart failure)


Qualifiers: 


     Congestive heart failure type: diastolic     Congestive heart failure 

chronicity: chronic        Qualified Code(s): I50.32 - Chronic diastolic (

congestive) heart failure  


Is this a current diagnosis for this admission?: YesPlan: 


Chronic diastolic congestive heart failure.  He appears to be euvolemic 

currently.








(4) Elevated hematocrit


Is this a current diagnosis for this admission?: YesPlan: 


Secondary to his underlying lung disease.








(5) PAF (paroxysmal atrial fibrillation)


Is this a current diagnosis for this admission?: YesPlan: 


Is in a regular rhythm today








(6) Pulmonary hypertension


Is this a current diagnosis for this admission?: Yes





(7) Do not resuscitate


Is this a current diagnosis for this admission?: Yes








- Time


Time Spent with patient: 25-34 minutes





- Inpatient Certification


Medical Necessity: Need Close Monitoring Due to Risk of Patient Decompensation





- Plan Summary


Plan Summary: 


If he continues to improve we can hopefully discharge home tomorrow.

## 2017-05-25 VITALS — DIASTOLIC BLOOD PRESSURE: 74 MMHG | SYSTOLIC BLOOD PRESSURE: 110 MMHG

## 2017-05-25 LAB
ANION GAP SERPL CALC-SCNC: 10 MMOL/L (ref 5–19)
BASOPHILS # BLD AUTO: 0 10^3/UL (ref 0–0.2)
BASOPHILS NFR BLD AUTO: 0.2 % (ref 0–2)
BUN SERPL-MCNC: 19 MG/DL (ref 7–20)
CALCIUM: 9.5 MG/DL (ref 8.4–10.2)
CHLORIDE SERPL-SCNC: 95 MMOL/L (ref 98–107)
CO2 SERPL-SCNC: 33 MMOL/L (ref 22–30)
CREAT SERPL-MCNC: 0.59 MG/DL (ref 0.52–1.25)
EOSINOPHIL # BLD AUTO: 0.1 10^3/UL (ref 0–0.6)
EOSINOPHIL NFR BLD AUTO: 0.9 % (ref 0–6)
ERYTHROCYTE [DISTWIDTH] IN BLOOD BY AUTOMATED COUNT: 14.2 % (ref 11.5–14)
GLUCOSE SERPL-MCNC: 96 MG/DL (ref 75–110)
HCT VFR BLD CALC: 56.8 % (ref 37.9–51)
HGB BLD-MCNC: 18.6 G/DL (ref 13.5–17)
HGB HCT DIFFERENCE: -1
LYMPHOCYTES # BLD AUTO: 1 10^3/UL (ref 0.5–4.7)
LYMPHOCYTES NFR BLD AUTO: 18.7 % (ref 13–45)
MCH RBC QN AUTO: 32.9 PG (ref 27–33.4)
MCHC RBC AUTO-ENTMCNC: 32.8 G/DL (ref 32–36)
MCV RBC AUTO: 100 FL (ref 80–97)
MONOCYTES # BLD AUTO: 0.7 10^3/UL (ref 0.1–1.4)
MONOCYTES NFR BLD AUTO: 13.4 % (ref 3–13)
NEUTROPHILS # BLD AUTO: 3.7 10^3/UL (ref 1.7–8.2)
NEUTS SEG NFR BLD AUTO: 66.8 % (ref 42–78)
POTASSIUM SERPL-SCNC: 4 MMOL/L (ref 3.6–5)
RBC # BLD AUTO: 5.67 10^6/UL (ref 4.35–5.55)
SODIUM SERPL-SCNC: 138.1 MMOL/L (ref 137–145)
WBC # BLD AUTO: 5.6 10^3/UL (ref 4–10.5)

## 2017-05-25 RX ADMIN — LEVALBUTEROL HYDROCHLORIDE SCH MG: 1.25 SOLUTION RESPIRATORY (INHALATION) at 03:57

## 2017-05-25 RX ADMIN — IPRATROPIUM BROMIDE SCH MG: 0.5 SOLUTION RESPIRATORY (INHALATION) at 03:57

## 2017-05-25 RX ADMIN — IPRATROPIUM BROMIDE SCH MG: 0.5 SOLUTION RESPIRATORY (INHALATION) at 08:04

## 2017-05-25 RX ADMIN — LEVALBUTEROL HYDROCHLORIDE SCH MG: 1.25 SOLUTION RESPIRATORY (INHALATION) at 08:04

## 2017-05-25 RX ADMIN — DOCUSATE SODIUM SCH MG: 100 CAPSULE, LIQUID FILLED ORAL at 09:29

## 2017-05-25 RX ADMIN — FLUTICASONE PROPIONATE AND SALMETEROL SCH INH: 50; 500 POWDER RESPIRATORY (INHALATION) at 09:30

## 2017-05-25 RX ADMIN — POTASSIUM CHLORIDE SCH MEQ: 750 TABLET, FILM COATED, EXTENDED RELEASE ORAL at 09:30

## 2017-05-25 RX ADMIN — LANSOPRAZOLE SCH MG: 30 TABLET, ORALLY DISINTEGRATING, DELAYED RELEASE ORAL at 05:10

## 2017-05-25 RX ADMIN — FONDAPARINUX SODIUM SCH MG: 2.5 INJECTION, SOLUTION SUBCUTANEOUS at 09:30

## 2017-05-25 RX ADMIN — LEVOFLOXACIN SCH MG: 750 TABLET, FILM COATED ORAL at 09:29

## 2017-05-25 RX ADMIN — LEVALBUTEROL HYDROCHLORIDE SCH MG: 1.25 SOLUTION RESPIRATORY (INHALATION) at 11:26

## 2017-05-25 RX ADMIN — IPRATROPIUM BROMIDE SCH MG: 0.5 SOLUTION RESPIRATORY (INHALATION) at 11:26

## 2017-05-25 RX ADMIN — PREDNISONE SCH MG: 20 TABLET ORAL at 09:30

## 2017-05-25 NOTE — PDOC DISCHARGE SUMMARY
General





- Admit/Disc Date/PCP


Admission Date/Primary Care Provider: 


  05/18/17 16:59





  





Discharge Date: 05/25/17





- Discharge Diagnosis


(1) Acute respiratory failure


Is this a current diagnosis for this admission?: YesSummary: 


Secondary to acute COPD exacerbation








(2) COPD with exacerbation


Is this a current diagnosis for this admission?: Yes





(3) CHF (congestive heart failure)


Is this a current diagnosis for this admission?: YesSummary: 


Chronic diastolic congestive heart failure








(4) Elevated hematocrit


Is this a current diagnosis for this admission?: Yes





(5) PAF (paroxysmal atrial fibrillation)


Is this a current diagnosis for this admission?: Yes





(6) Pulmonary hypertension


Is this a current diagnosis for this admission?: Yes





(7) Do not resuscitate


Is this a current diagnosis for this admission?: Yes








- Additional Information


Resuscitation Status: Do Not Resuscitate


Discharge Diet: Cardiac


Discharge Activity: Activity As Tolerated


Home Medications: 








Fluticasone/Salmeterol [Advair 500-50 Diskus 14 Dose/Diskus] 1 inh IH Q12 #1 

inhaler 05/25/17 


Ipratropium/Albuterol Sulfate [Duoneb 3 ml Ampul] 3 ml NEB RTQID #120 vial.neb 

05/25/17 


Potassium Chloride [Klor-Con 10 Meq Tablet.sa] 20 meq PO Q12 #60 tablet.sa 05/25 /17 


Prednisone [Deltasone 20 mg Tablet] 10 mg PO DAILY #39 tablet 05/25/17 











History of Present Illness


History of Present Illness: 


BING BURGESS SR is a 66 year old male history of COPD on chronic oxygen at 

2 L who presented with a two-week history of cough and congestion.  Patient 

began to have worsening of his breathing and developed low-grade fever as well 

as began to have wheezing consistent with an acute COPD exacerbation along with 

bronchitis.  Patient was initially requiring BiPAP was admitted for further 

evaluation.








Hospital Course


Hospital Course: 


66-year-old gentleman with chronic obstructive pulmonary disease on chronic 

home oxygen who presented with worsening shortness of breath consistent with 

acute COPD exacerbation and acute bronchitis.  Patient was treated with IV 

steroids, nebulizers and required a BiPAP initially.  His respiratory status 

improved and he was no longer requiring BiPAP.  The patient had his oxygen 

saturations checked on the day of discharge and they were 86% on room air.  He 

is being sent home on 3 L per nasal cannula along with a portable oxygen.  

Patient when admitted also was treated for acute bronchitis with IV 

antibiotics.  Patient's cultures were negative.  The patient's other medical 

problems all were stable during his hospitalization.








Physical Exam


Vital Signs: 


 











Temp Pulse Resp BP Pulse Ox


 


 98.3 F   97   18   110/74   94 


 


 05/25/17 08:48  05/25/17 11:26  05/25/17 11:26  05/25/17 08:48  05/25/17 11:26








 Intake & Output











 05/24/17 05/25/17 05/26/17





 06:59 06:59 06:59


 


Intake Total 1516 1843 


 


Output Total 1125 775 


 


Balance 391 1068 


 


Weight 74 kg 74 kg 











General appearance: PRESENT: no acute distress


Eye exam: PRESENT: conjunctiva pink.  ABSENT: scleral icterus


Mouth exam: PRESENT: moist, tongue midline


Neck exam: ABSENT: JVD


Respiratory exam: PRESENT: wheezes - Scattered wheezes worse on the left..  

ABSENT: rales, rhonchi


Cardiovascular exam: PRESENT: RRR.  ABSENT: diastolic murmur, rubs, systolic 

murmur


Vascular exam: PRESENT: normal capillary refill


GI/Abdominal exam: PRESENT: normal bowel sounds, soft.  ABSENT: distended, 

guarding, mass, organolmegaly, rebound, tenderness


Extremities exam: ABSENT: calf tenderness, clubbing, pedal edema


Neurological exam: PRESENT: alert, awake, oriented to person, oriented to place

, oriented to time, oriented to situation, CN II-XII grossly intact.  ABSENT: 

motor sensory deficit


Psychiatric exam: PRESENT: appropriate affect


Skin exam: PRESENT: dry, intact, warm.  ABSENT: cyanosis, rash





Results


Laboratory Results: 


 





 05/25/17 03:56 





 05/25/17 03:56 





 











  05/25/17 05/25/17





  03:56 03:56


 


WBC  5.6 


 


RBC  5.67 H 


 


Hgb  18.6 H 


 


Hct  56.8 H 


 


MCV  100 H 


 


MCH  32.9 


 


MCHC  32.8 


 


RDW  14.2 H 


 


Plt Count  121 L 


 


Seg Neutrophils %  66.8 


 


Lymphocytes %  18.7 


 


Monocytes %  13.4 H 


 


Eosinophils %  0.9 


 


Basophils %  0.2 


 


Absolute Neutrophils  3.7 


 


Absolute Lymphocytes  1.0 


 


Absolute Monocytes  0.7 


 


Absolute Eosinophils  0.1 


 


Absolute Basophils  0.0 


 


Sodium   138.1


 


Potassium   4.0


 


Chloride   95 L


 


Carbon Dioxide   33 H


 


Anion Gap   10


 


BUN   19


 


Creatinine   0.59


 


Est GFR ( Amer)   > 60


 


Est GFR (Non-Af Amer)   > 60


 


Glucose   96


 


Calcium   9.5











Impressions: 


 





Chest/Abdomen CTA  05/18/17 11:17


IMPRESSION:  NORMAL CTA OF THE CHEST. NO PULMONARY EMBOLI.


 








Chest X-Ray  05/20/17 06:00


IMPRESSION:  COPD.  NO ACUTE RADIOGRAPHIC FINDING IN THE CHEST.


 














Qualifiers


**PATEINT BEING DISCHARGED WITH ANY OF THE FOLLOWING DIAGNOSIS?: No





Plan


Discharge Plan: 


Is discharged home with home health for oxygen at 3 L per nasal cannula.  Will 

follow up with primary care doctor in 1-2 weeks.


Time Spent: Greater than 30 Minutes

## 2017-06-03 ENCOUNTER — HOSPITAL ENCOUNTER (EMERGENCY)
Dept: HOSPITAL 62 - ER | Age: 67
Discharge: ANOTHER HOSPITAL | End: 2017-06-03
Payer: MEDICARE

## 2017-06-03 VITALS — SYSTOLIC BLOOD PRESSURE: 93 MMHG | DIASTOLIC BLOOD PRESSURE: 79 MMHG
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
## 2017-06-03 DIAGNOSIS — A41.89: Primary | ICD-10-CM

## 2017-06-03 DIAGNOSIS — R00.0: ICD-10-CM

## 2017-06-03 DIAGNOSIS — Z87.891: ICD-10-CM

## 2017-06-03 DIAGNOSIS — R65.21: ICD-10-CM

## 2017-06-03 DIAGNOSIS — J44.9: ICD-10-CM

## 2017-06-03 DIAGNOSIS — I47.1: ICD-10-CM

## 2017-06-03 DIAGNOSIS — M79.1: ICD-10-CM

## 2017-06-03 DIAGNOSIS — R53.1: ICD-10-CM

## 2017-06-03 DIAGNOSIS — R07.9: ICD-10-CM

## 2017-06-03 DIAGNOSIS — R53.81: ICD-10-CM

## 2017-06-03 DIAGNOSIS — I50.9: ICD-10-CM

## 2017-06-03 DIAGNOSIS — R06.02: ICD-10-CM

## 2017-06-03 DIAGNOSIS — R05: ICD-10-CM

## 2017-06-03 DIAGNOSIS — S61.412A: ICD-10-CM

## 2017-06-03 DIAGNOSIS — I95.9: ICD-10-CM

## 2017-06-03 LAB
ALBUMIN SERPL-MCNC: 3 G/DL (ref 3.5–5)
ALP SERPL-CCNC: 35 U/L (ref 38–126)
ALT SERPL-CCNC: 68 U/L (ref 21–72)
ANION GAP SERPL CALC-SCNC: 13 MMOL/L (ref 5–19)
AST SERPL-CCNC: 39 U/L (ref 17–59)
BASE EXCESS BLDV CALC-SCNC: 2.4 MMOL/L
BASOPHILS NFR BLD MANUAL: 0 % (ref 0–2)
BILIRUB DIRECT SERPL-MCNC: 0.3 MG/DL (ref 0–0.4)
BILIRUB SERPL-MCNC: 1.3 MG/DL (ref 0.2–1.3)
BUN SERPL-MCNC: 20 MG/DL (ref 7–20)
CALCIUM: 9 MG/DL (ref 8.4–10.2)
CHLORIDE SERPL-SCNC: 91 MMOL/L (ref 98–107)
CK MB SERPL-MCNC: 0.34 NG/ML (ref ?–4.55)
CK SERPL-CCNC: < 20 U/L (ref 55–170)
CO2 SERPL-SCNC: 31 MMOL/L (ref 22–30)
CREAT SERPL-MCNC: 1.74 MG/DL (ref 0.52–1.25)
EOSINOPHIL NFR BLD MANUAL: 0 % (ref 0–6)
ERYTHROCYTE [DISTWIDTH] IN BLOOD BY AUTOMATED COUNT: 14.1 % (ref 11.5–14)
GLUCOSE SERPL-MCNC: 87 MG/DL (ref 75–110)
HCO3 BLDV-SCNC: 29.6 MMOL/L (ref 20–32)
HCT VFR BLD CALC: 48.2 % (ref 37.9–51)
HGB BLD-MCNC: 15.7 G/DL (ref 13.5–17)
HGB HCT DIFFERENCE: -1.1
MACROCYTES BLD QL SMEAR: SLIGHT
MCH RBC QN AUTO: 32.4 PG (ref 27–33.4)
MCHC RBC AUTO-ENTMCNC: 32.6 G/DL (ref 32–36)
MCV RBC AUTO: 100 FL (ref 80–97)
NEUTS BAND NFR BLD MANUAL: 10 % (ref 3–5)
PCO2 BLDV: 55.6 MMHG (ref 35–63)
PH BLDV: 7.34 [PH] (ref 7.3–7.42)
POTASSIUM SERPL-SCNC: 3.4 MMOL/L (ref 3.6–5)
PROT SERPL-MCNC: 5.3 G/DL (ref 6.3–8.2)
RBC # BLD AUTO: 4.84 10^6/UL (ref 4.35–5.55)
SODIUM SERPL-SCNC: 135.1 MMOL/L (ref 137–145)
TOTAL CELLS COUNTED BLD: 100
TROPONIN I SERPL-MCNC: 0.04 NG/ML
VARIANT LYMPHS NFR BLD MANUAL: 8 % (ref 13–45)
WBC # BLD AUTO: 34 10^3/UL (ref 4–10.5)

## 2017-06-03 PROCEDURE — 99291 CRITICAL CARE FIRST HOUR: CPT

## 2017-06-03 PROCEDURE — 87186 SC STD MICRODIL/AGAR DIL: CPT

## 2017-06-03 PROCEDURE — 84484 ASSAY OF TROPONIN QUANT: CPT

## 2017-06-03 PROCEDURE — 83605 ASSAY OF LACTIC ACID: CPT

## 2017-06-03 PROCEDURE — 96366 THER/PROPH/DIAG IV INF ADDON: CPT

## 2017-06-03 PROCEDURE — 87075 CULTR BACTERIA EXCEPT BLOOD: CPT

## 2017-06-03 PROCEDURE — 96375 TX/PRO/DX INJ NEW DRUG ADDON: CPT

## 2017-06-03 PROCEDURE — 96365 THER/PROPH/DIAG IV INF INIT: CPT

## 2017-06-03 PROCEDURE — 93010 ELECTROCARDIOGRAM REPORT: CPT

## 2017-06-03 PROCEDURE — 93005 ELECTROCARDIOGRAM TRACING: CPT

## 2017-06-03 PROCEDURE — 74177 CT ABD & PELVIS W/CONTRAST: CPT

## 2017-06-03 PROCEDURE — 87070 CULTURE OTHR SPECIMN AEROBIC: CPT

## 2017-06-03 PROCEDURE — 96368 THER/DIAG CONCURRENT INF: CPT

## 2017-06-03 PROCEDURE — 82553 CREATINE MB FRACTION: CPT

## 2017-06-03 PROCEDURE — 99292 CRITICAL CARE ADDL 30 MIN: CPT

## 2017-06-03 PROCEDURE — 96361 HYDRATE IV INFUSION ADD-ON: CPT

## 2017-06-03 PROCEDURE — 87040 BLOOD CULTURE FOR BACTERIA: CPT

## 2017-06-03 PROCEDURE — 94640 AIRWAY INHALATION TREATMENT: CPT

## 2017-06-03 PROCEDURE — 96376 TX/PRO/DX INJ SAME DRUG ADON: CPT

## 2017-06-03 PROCEDURE — 80053 COMPREHEN METABOLIC PANEL: CPT

## 2017-06-03 PROCEDURE — 85025 COMPLETE CBC W/AUTO DIFF WBC: CPT

## 2017-06-03 PROCEDURE — 87205 SMEAR GRAM STAIN: CPT

## 2017-06-03 PROCEDURE — 71010: CPT

## 2017-06-03 PROCEDURE — 36415 COLL VENOUS BLD VENIPUNCTURE: CPT

## 2017-06-03 PROCEDURE — 82550 ASSAY OF CK (CPK): CPT

## 2017-06-03 PROCEDURE — 87077 CULTURE AEROBIC IDENTIFY: CPT

## 2017-06-03 PROCEDURE — 82803 BLOOD GASES ANY COMBINATION: CPT

## 2017-06-03 PROCEDURE — 73130 X-RAY EXAM OF HAND: CPT

## 2017-06-03 RX ADMIN — SODIUM CHLORIDE PRN ML: 9 INJECTION, SOLUTION INTRAVENOUS at 18:40

## 2017-06-03 RX ADMIN — SODIUM CHLORIDE PRN ML: 9 INJECTION, SOLUTION INTRAVENOUS at 15:45

## 2017-06-03 NOTE — RADIOLOGY REPORT (SQ)
EXAM DESCRIPTION:  HAND LEFT 3 VIEWS



COMPLETED DATE/TIME:  6/3/2017 5:43 pm



REASON FOR STUDY:  swelling



COMPARISON:  None.



EXAM PARAMETERS:  NUMBER OF VIEWS: Three views.

TECHNIQUE: AP, lateral and oblique  radiographic images acquired of the left hand.

LIMITATIONS: None.



FINDINGS:  MINERALIZATION: Normal.

BONES: No acute fracture or dislocation.  No worrisome bone lesions.  Background of diffuse degenerat
ariana changes most significantly involving the interphalangeal joints and 1st carpal metacarpal joint.

JOINTS: No effusions.

SOFT TISSUES: Dorsal soft tissue swelling.  Incidental note is made of probable surgical clips involv
ing the distal forearm, adjacent to the radius.

OTHER: No other significant finding.



IMPRESSION:  Dorsal soft tissue swelling without underlying osseous injury or abnormality.



TECHNICAL DOCUMENTATION:  JOB ID:  [id number]

 2011 [employer]- All Rights Reserved

## 2017-06-03 NOTE — ER DOCUMENT REPORT
ED General





- General


Chief Complaint: Chest Pain


Stated Complaint: CHEST PAIN


Time Seen by Provider: 06/03/17 15:33


Mode of Arrival: Ambulatory


Information source: Patient


TRAVEL OUTSIDE OF THE [place] IN LAST 30 DAYS: No





- HPI


Patient complains to provider of: Hypotension, shortness of breath


Onset: Other - 2 days


Onset/Duration: Gradual


Quality of pain: Achy


Severity: Moderate


Pain Level: 4


Associated symptoms: Body/muscle aches, Nonproductive cough, Shortness of breath

, Weakness


Similar symptoms previously: No


Recently seen / treated by doctor: Yes


Notes: 


Patient is a 66-year-old male with a history of COPD who quit smoking 2 weeks 

ago, who presents to the emergency room complaining of generalized weakness 

with difficulty breathing and swelling and redness to his left arm, patient is 

noted to be tachycardic, hypotensive and hypoxic in triage area, he reports 

that on Thursday of this week he was fishing for crab when the crab bit him on 

the left hand causing an open wound, he was then in the water fishing shortly 

thereafter, over the past 2-3 days he has noted increased swelling and redness 

to his left arm with increased pain, he is also complaining of low back pain 

and shortness of breath





- Related Data


Allergies/Adverse Reactions: 


 





No Known Allergies Allergy (Verified 03/09/17 14:05)


 











Past Medical History





- General


Information source: Patient





- Social History


Smoking Status: Former Smoker


Chew tobacco use (# tins/day): No


Frequency of alcohol use: Social


Drug Abuse: None


Family History: Hypertension





- Past Medical History


Cardiac Medical History: Reports: Hx Congestive Heart Failure - diastolic dysfx.


Pulmonary Medical History: Reports: Hx COPD


Neurological Medical History: Denies: Hx Seizures


Renal/ Medical History: Denies: Hx Peritoneal Dialysis


Psychiatric Medical History: 


   Denies: Hx Depression


Past Surgical History: Reports: Hx Orthopedic Surgery





- Immunizations


Hx Diphtheria, Pertussis, Tetanus Vaccination: Yes





Review of Systems





- Review of Systems


Constitutional: Malaise, Weakness


EENT: No symptoms reported


Cardiovascular: Chest pain


Respiratory: Short of breath


Gastrointestinal: No symptoms reported


Genitourinary: No symptoms reported


Male Genitourinary: No symptoms reported


Musculoskeletal: See HPI


Skin: See HPI


Hematologic/Lymphatic: No symptoms reported


Neurological/Psychological: No symptoms reported


-: Yes All other systems reviewed and negative





Physical Exam





- Vital signs


Vitals: 


 











Pulse Ox


 


 95 


 


 06/03/17 15:20











Interpretation: Hypotensive, Tachycardic, Hypoxic





- General


General appearance: Alert


In distress: Moderate





- HEENT


Head: Normocephalic, Atraumatic


Eyes: Normal


Conjunctiva: Injected


Extraocular movements intact: Yes


Eyelashes: Normal


Pupils: PERRL


Pharynx: Normal


Neck: Normal





- Respiratory


Respiratory status: Labored


Chest status: Nontender


Breath sounds: Nonproductive cough, Wheezing





- Cardiovascular


Rhythm: Regular, Tachycardia





- Abdominal


Inspection: Normal


Distension: No distension


Bowel sounds: Normal


Tenderness: Nontender


Organomegaly: No organomegaly





- Back


Back: Tender - Lumbar paraspinal





- Extremities


General lower extremity: Normal inspection


Hand: Other - Left hand with approximate 2 cm scabbed laceration, thenar 

eminence, with erythema and swelling from the fingertips to just distal of the 

elbow, with tenderness as well as edema to radial pulses, brisk capillary refill

, distal sensation is intact





- Neurological


Neuro grossly intact: Yes


Cognition: Normal


Orientation: AAOx4


Yunier Coma Scale Eye Opening: Spontaneous


Fairborn Coma Scale Verbal: Oriented


Fairborn Coma Scale Motor: Obeys Commands


Fairborn Coma Scale Total: 15





- Skin


Skin Color: Normal





Course





- Re-evaluation


Re-evalutation: 


06/03/17 19:16


Patient with signs and symptoms consistent with septic shock, likely from 

vibrio species from recent Marine infection, as patient was bit by a crab on 

the hand on Thursday night and was fishing Thursday and Friday, patient 

requiring large amount of IV fluids, after 3-4 L he continues to be hypotensive

, patient is now receiving Levophed as well as Vignesh-Synephrine, IV antibiotics 

and IV fluids, there are no ICU beds at this facility, therefore patient was 

discussed with the ICU attending [facility], Dr. Reynoso, 

graciously accepted patient for transfer, we are awaiting a bed assignment and 

transportation arrangements at this point in time, patient continued to be 

alert and oriented, he is maintaining his airway, he remains hypotensive and 

tachycardic





06/03/17 20:27


Patient continues to rest comfortably, he is awake and alert, answering 

questions appropriately maintaining his airway, he remains slightly tachycardic 

with a heart rate around 115, his current blood pressure is 86/61, and is to 

complain of pain in his back, additional IV fentanyl has been ordered, awaiting 

[facility] support to arrive to take patient to [facility]





- Vital Signs


Vital signs: 


 











Temp Pulse Resp BP Pulse Ox


 


 100.6 F H  115 H  19   86/61 L  94 


 


 06/03/17 20:02  06/03/17 16:00  06/03/17 20:27  06/03/17 20:27  06/03/17 20:27














- Laboratory


Result Diagrams: 


 06/03/17 15:40





 06/03/17 15:40


Laboratory results interpreted by me: 


 











  06/03/17 06/03/17 06/03/17





  15:40 15:40 15:40


 


WBC  34.0 H*  


 


MCV  100 H  


 


RDW  14.1 H  


 


Band Neutrophils %  10 H  


 


Lymphocytes % (Manual)  8 L  


 


Metamyelocytes %  1 H  


 


Abs Neuts (Manual)  29.6 H  


 


Abs Monocytes (Manual)  1.7 H  


 


Sodium   135.1 L 


 


Potassium   3.4 L 


 


Chloride   91 L 


 


Carbon Dioxide   31 H 


 


Creatinine   1.74 H 


 


Est GFR ( Amer)   48 L 


 


Est GFR (Non-Af Amer)   40 L 


 


Lactic Acid    5.6 H


 


Alkaline Phosphatase   35 L 


 


Creatine Kinase   < 20 L 


 


Total Protein   5.3 L 


 


Albumin   3.0 L 














  06/03/17





  20:00


 


WBC 


 


MCV 


 


RDW 


 


Band Neutrophils % 


 


Lymphocytes % (Manual) 


 


Metamyelocytes % 


 


Abs Neuts (Manual) 


 


Abs Monocytes (Manual) 


 


Sodium 


 


Potassium 


 


Chloride 


 


Carbon Dioxide 


 


Creatinine 


 


Est GFR ( Amer) 


 


Est GFR (Non-Af Amer) 


 


Lactic Acid  4.8 H


 


Alkaline Phosphatase 


 


Creatine Kinase 


 


Total Protein 


 


Albumin 














- Diagnostic Test


Radiology reviewed: Image reviewed, Reports reviewed





- EKG Interpretation by Me


EKG shows normal: Sinus rhythm


Rate: Tachycardia


When compared to previous EKG there are: No significant change





Critical Care Note





- Critical Care Note


Total time excluding time spent on procedures (mins): 180


Comments: 


Patient arrived hypoxic, hypotensive, tachycardic, with septic shock from 

Marine related infection to left hand, requiring IV fluids, antibiotics, 

pressors, and air transport to tertiary [facility] for evaluation and treatment





Discharge





- Discharge


Clinical Impression: 


 Septic shock, Infection of left hand





Condition: Critical


Disposition: [facility]

## 2017-06-03 NOTE — RADIOLOGY REPORT (SQ)
EXAM DESCRIPTION:  CHEST SINGLE VIEW



COMPLETED DATE/TIME:  6/3/2017 3:39 pm



REASON FOR STUDY:  bed 6 cp



COMPARISON:  CT angio chest 5/18/2017

Chest x-ray 5/20/2017, 5/18/2017



EXAM PARAMETERS:  NUMBER OF VIEWS: One view.

TECHNIQUE: Single frontal radiographic view of the chest acquired.

RADIATION DOSE: NA

LIMITATIONS: None.



FINDINGS:  LUNGS AND PLEURA: Stable scarring left lung apex.  Upper lobes are hyperlucent from obstru
ctive disease.  No focal infiltrates, pleural effusions or pneumothorax.

MEDIASTINUM AND HILAR STRUCTURES: No masses.  Contour normal.

HEART AND VASCULAR STRUCTURES: Heart normal in size.  Normal vasculature.

BONES: No acute findings.

HARDWARE: None in the chest.

OTHER: No other significant finding.



IMPRESSION:  NO ACUTE RADIOGRAPHIC FINDING IN THE CHEST.



TECHNICAL DOCUMENTATION:  JOB ID:  [id number]

## 2017-06-04 NOTE — EKG REPORT
SEVERITY:- ABNORMAL ECG -

SINUS TACHYCARDIA

MULTIFORM VENTRICULAR PREMATURE COMPLEXES

ROJELIO, CONSIDER BIATRIAL ABNORMALITIES

LEFT ANTERIOR FASCICULAR BLOCK

NONSPECIFIC T ABNORMALITIES, ANT-LAT LEADS

:

Confirmed by: Nate Encarnacion 04-Jun-2017 09:22:19

## 2018-08-04 ENCOUNTER — HOSPITAL ENCOUNTER (EMERGENCY)
Dept: HOSPITAL 62 - ER | Age: 68
Discharge: HOME | End: 2018-08-04
Payer: MEDICARE

## 2018-08-04 VITALS — DIASTOLIC BLOOD PRESSURE: 80 MMHG | SYSTOLIC BLOOD PRESSURE: 140 MMHG

## 2018-08-04 DIAGNOSIS — J44.9: ICD-10-CM

## 2018-08-04 DIAGNOSIS — F17.200: ICD-10-CM

## 2018-08-04 DIAGNOSIS — Z79.899: ICD-10-CM

## 2018-08-04 DIAGNOSIS — S41.111A: ICD-10-CM

## 2018-08-04 DIAGNOSIS — Z99.81: ICD-10-CM

## 2018-08-04 DIAGNOSIS — S61.311A: Primary | ICD-10-CM
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
## 2018-08-04 PROCEDURE — 12002 RPR S/N/AX/GEN/TRNK2.6-7.5CM: CPT

## 2018-08-04 PROCEDURE — 94640 AIRWAY INHALATION TREATMENT: CPT

## 2018-08-04 PROCEDURE — 99283 EMERGENCY DEPT VISIT LOW MDM: CPT

## 2018-08-04 PROCEDURE — 73130 X-RAY EXAM OF HAND: CPT

## 2018-08-04 NOTE — ER DOCUMENT REPORT
ED General





- General


Chief Complaint: Finger Injury


Stated Complaint: FINGER LACERATION


Time Seen by Provider: 08/04/18 01:43


Notes: 





Patient is a 67-year-old male presents with complaint of partial degloving of 

the second and third fingers of his left hand.  Patient says that his son from 

the door in his fingers.  Patient had a tetanus shot last year.  He is not on 

any blood thinning medications.  He has no other complaints otherwise feels 

well.  Patient's oxygenation was low in triage.  Patient is mostly wearing 

oxygen.  He has a history of COPD and uses inhalers every day.


TRAVEL OUTSIDE OF THE [place] IN LAST 30 DAYS: No





- Related Data


Allergies/Adverse Reactions: 


 





No Known Allergies Allergy (Verified 08/04/18 01:46)


 











Past Medical History





- Social History


Smoking Status: Current Every Day Smoker


Frequency of alcohol use: Heavy


Drug Abuse: None


Family History: Hypertension


Patient has suicidal ideation: No


Patient has homicidal ideation: No





- Past Medical History


Cardiac Medical History: Reports: Hx Congestive Heart Failure - diastolic dysfx.


Pulmonary Medical History: Reports: Hx COPD


Neurological Medical History: Denies: Hx Seizures


Renal/ Medical History: Denies: Hx Peritoneal Dialysis


Psychiatric Medical History: 


   Denies: Hx Depression


Past Surgical History: Reports: Hx Orthopedic Surgery





- Immunizations


Hx Diphtheria, Pertussis, Tetanus Vaccination: Yes





Review of Systems





- Review of Systems


Notes: 





My Normal Review Basic





REVIEW OF SYSTEMS:


CONSTITUTIONAL :  Denies fever,  chills, or sweats.  Denies recent illness.


MUSCULOSKELETAL: Injury to left hand.


SKIN:   Denies rash or skin lesions.


HEMATOLOGIC :   Denies easy bruising or bleeding.


NEUROLOGICAL: No new sensory or motor loss.


ALL OTHER SYSTEMS REVIEWED AND NEGATIVE.





Physical Exam





- Vital signs


Vitals: 


 











Temp Pulse Resp BP Pulse Ox


 


 97.6 F   108 H  18   143/83 H  97 


 


 08/04/18 01:41  08/04/18 01:41  08/04/18 01:41  08/04/18 01:41  08/04/18 01:41














- Notes


Notes: 





General Appearance: Well nourished, alert, cooperative, no acute distress, mild 

to moderate obvious discomfort.


Vitals: reviewed, See vital signs table.


Eyes: PERRL, EOMI, Conjuctiva clear


Lungs: Scattered wheezing., No rales, No rhonci, No accessory muscle use, good 

air exchange bilaterally.


Heart: Normal rate, Regular rythm, No murmur, no rub


Abdomen: Normal BS, soft, No rigidity, No abdominal tenderness, No guarding, no 

rebound, no abdominal masses, no organomegaly


Extremities: strength 5/5 in all extremities, good pulses in all extremities, 

patient has partial degloving of both the second and third digits of the left 

hand.  Patient is unable to fully flex these fingers but he says this is 

chronic and unchanged from his injury to his left arm and hand from last year.  

He does not have bone exposure at this time.  Does have a sub-ungual hematoma 

over the second left fingernail.  Small skin tear over right arm.


Skin: warm, dry, appropriate color, no rash


Neuro: speech clear, oriented x 3, normal affect, responds appropriately to 

questions.





Course





- Re-evaluation


Re-evalutation: 





08/04/18 06:28


Patient does have some diminished flexion of these fingers but this is 

unchanged and related to him having to have surgery on his left forearm last 

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 

extremity.  Patient does not have any new neurologic deficits.  Wounds were 

cleaned and thoroughly and were sutured closed.  I trephinated the small 

subungual hematoma underneath the left second fingernail.  Place a Tegaderm 

dressing over the small skin tear in the patient's right arm.





- Vital Signs


Vital signs: 


 











Temp Pulse Resp BP Pulse Ox


 


 98 F   99   20   140/80 H  98 


 


 08/04/18 04:35  08/04/18 04:35  08/04/18 04:35  08/04/18 04:35  08/04/18 04:35














Procedures





- Laceration/Wound Repair


  ** Left 2nd digit


Wound length (cm): 3


Wound's Depth, Shape: Irregular


Laceration pre-procedure: Shur-Clens applied


Anesthetic type: 1% Lidocaine


Volume Anesthetic (mLs): 1


Wound explored: Clean


Irrigated w/ Saline (mLs): 40


Wound Repaired With: Sutures


Suture Size/Type: 5:0, Ethilon


Number of Sutures: 7


Complications: No





  ** left 3rd digit


Wound length (cm): 3


Wound's Depth, Shape: Irregular


Anesthetic type: 1% Lidocaine


Volume Anesthetic (mLs): 1


Wound explored: Clean


Irrigated w/ Saline (mLs): 40


Wound Repaired With: Sutures


Suture Size/Type: 5:0, Ethilon


Number of Sutures: 7





Discharge





- Discharge


Clinical Impression: 


 Laceration





Subungual hematoma of finger


Qualifiers:


 Encounter type: initial encounter Qualified Code(s): S60.10XA - Contusion of 

unspecified finger with damage to nail, initial encounter





COPD (chronic obstructive pulmonary disease)


Qualifiers:


 COPD type: unspecified COPD Qualified Code(s): J44.9 - Chronic obstructive 

pulmonary disease, unspecified





Condition: Good


Disposition: HOME, SELF-CARE


Additional Instructions: 


LACERATION CARE:





     Your laceration has been sutured to keep the skin edges aligned during 

healing.  The time of suture removal depends on the nature and location of your 

cut.  Please follow the care instructions the doctor has outlined for you and 

return for further care, according to the schedule you've been given.


     Keep the wound and dressing clean.  Unless you were told otherwise, you 

may shower daily, blotting the wound dry with a clean, unused towel.  At other 

times, If the dressing gets wet or blood soaked, remove it and blot the wound 

dry, then reapply a new dressing.  Unless you were instructed otherwise, 

dressings should be changed at least daily.


     If any signs of infection occur (swelling, redness, drainage, increasing 

tenderness, red streaks, tender lumps in the armpit or groin above the 

laceration, or fever), see the doctor immediately.








SOAP CLEANSING:


     Gently wash the wound daily using a mild soap (like Ivory, Phisoderm, 

Neutrogena).  Use warm water, rubbing gently until all debris, ooze, and 

crusting have been washed from the wound.  Allow to dry briefly (about 10 

minutes) after cleaning.  Repeat this cleansing at least three times a day for 

the first two days and then once or twice a day.











PROPHYLACTIC ANTIBIOTIC:


     The antibiotics which have been prescribed are designed to decrease the 

risk of infection.  Only certain types of wounds benefit from this -- the 

typical cut, scrape, or burn DOES NOT require antibiotics.  Of course, 

infection can still occur despite the use of prophylactic antibiotics.


     Your wound will heal with less chance of an infectious complication if you 

take the medication as directed.  The most important dose is the FIRST dose, so 

don't delay filling the prescription!








ORAL NARCOTIC MEDICATION:


     You have been given a prescription for pain control.  This medication is a 

narcotic.  It's best taken with food, as nausea can result if taken on an empty 

stomach.


     Don't operate machinery or drive within six hours of taking this 

medication.  Do not combine this medicine with alcohol, or with any medication 

which can cause sedation (such as cold tablets or sleeping pills) unless you 

get permission from the physician.


     Narcotics tend to cause constipation.  If possible, drink plenty of fluids 

and eat a diet high in fiber and fruits.








FOLLOW-UP CARE:


     


    Your sutures should be removed in  ___7__  days.





    To facilitate a timely removal of your sutures, you may return to the 

Emergency Department at [facility].  You do not need to call for 

an appointment, but the best time to come in for suture removal is early in the 

morning.





     If you have been referred to another physician for follow-up care, call 

that physicians office for an appointment as you were instructed.  If you 

experience a significant change in your laceration, or if you are concerned 

there may be an infection (swelling, redness, drainage, increasing tenderness, 

red streaks, tender lumps in the armpit or groin above the laceration, or fever)

, return to the Emergency Department immediately re-evaluation.








Please wear your oxygen and use your inhalers as prescribed by your doctor. 

Please return to the ER if you have worsening wheezing, difficulty breathing, 

or feel unwell.





Prescriptions: 


Cephalexin Monohydrate [Keflex 500 mg Capsule] 500 mg PO BID 5 Days #10 capsule

## 2018-08-04 NOTE — RADIOLOGY REPORT (SQ)
EXAM DESCRIPTION: 



XR HAND 3 OR MORE VIEWS



COMPLETED DATE/TME:  08/04/2018 01:49



CLINICAL HISTORY: 



67 years, Male, trauma



COMPARISON:

None.



NUMBER OF VIEWS:

3



LIMITATIONS:

None.



FINDINGS:



Moderate swelling of the distal and mid left third finger,

moderate bone demineralization, surgical clips at the dorsum of

the proximal left hand and distal wrist. Mild osteoarthritis

involves interphalangeal joints and first carpometacarpal joint.



IMPRESSION:



Swelling.

## 2025-03-04 NOTE — RADIOLOGY REPORT (SQ)
EXAM DESCRIPTION:  CT ABD/PELVIS WITH IV ONLY



COMPLETED DATE/TIME:  6/3/2017 5:56 pm



REASON FOR STUDY:  back pain



COMPARISON:  8/10/2015



TECHNIQUE:  CT scan of the abdomen and pelvis performed using helical scanning technique with dynamic
 intravenous contrast injection.  No oral contrast. Images reviewed with lung, soft tissue, and bone 
windows. Reconstructed coronal and sagittal MPR images reviewed. Delayed images for evaluation of the
 urinary system also acquired. All images stored on PACS.

All CT scanners at this facility use dose modulation, iterative reconstruction, and/or weight based d
osing when appropriate to reduce radiation dose to as low as reasonably achievable (ALARA).

[facility]: Dose Right  [facility]: CareDose    [facility]: Dose Right    [facility]: Teradose 4D    [facility]: Smart Technologies



CONTRAST TYPE AND DOSE:  100mL Isovue 300- low osmolar.



RENAL FUNCTION:  BUN 20; creatinine 1.74



RADIATION DOSE:  19.59mGy.



LIMITATIONS:  None.



FINDINGS:  LOWER CHEST: Bibasilar atelectasis.

LIVER: Normal size.  Diffusely decreased attenuation consistent with fatty infiltration, noting focal
 sparing about the gallbladder fossa.  No intrahepatic or extrahepatic biliary dilatation.

SPLEEN: Normal size. No focal lesions.

PANCREAS: No masses. No significant calcifications. No adjacent inflammation or peripancreatic fluid 
collections. Pancreatic duct not dilated.

GALLBLADDER: No identified stones by CT criteria. No inflammatory changes to suggest cholecystitis.

ADRENAL GLANDS: No significant masses or asymmetry.

RIGHT KIDNEY AND URETER: Symmetric perinephric fat stranding.  No solid masses.   No significant calc
ifications.   No hydronephrosis or hydroureter.

LEFT KIDNEY AND URETER: Symmetric perinephric fat stranding.  No solid masses.   No significant calci
fications.   No hydronephrosis or hydroureter.

AORTA AND VESSELS: No aneurysm. No dissection. Renal arteries, SMA, celiac without stenosis.

RETROPERITONEUM: No retroperitoneal adenopathy, hemorrhage or masses.

BOWEL AND PERITONEAL CAVITY: No masses or inflammatory changes. No free fluid or peritoneal masses.

APPENDIX: Not visualized.

PELVIS: The bladder wall appears symmetrically thickened; this is a nonspecific finding.  No free flu
id is seen within the pelvis

ABDOMINAL WALL: Fat and fluid containing right inguinal hernia.

BONES: No significant or acute findings.

OTHER: No other significant finding.



IMPRESSION:  Symmetric perinephric fat stranding and symmetrically thickened bladder wall are nonspec
ific findings.  However, given patient's reported back pain, recommend correlation for possible cysti
tis and/or pyelonephritis.  Otherwise unremarkable examination.



TECHNICAL DOCUMENTATION:  JOB ID:  [id number]

Quality ID # [id number]: Final reports with documentation of one or more dose reduction techniques (e.g., Au
tomated exposure control, adjustment of the mA and/or kV according to patient size, use of iterative 
reconstruction technique)

 2011 [employer]- All Rights Reserved Mother returned nurses call. Mother agreed to address during the [facility] that he has tomorrow at 3:30pm